# Patient Record
Sex: FEMALE | Race: BLACK OR AFRICAN AMERICAN | Employment: OTHER | URBAN - METROPOLITAN AREA
[De-identification: names, ages, dates, MRNs, and addresses within clinical notes are randomized per-mention and may not be internally consistent; named-entity substitution may affect disease eponyms.]

---

## 2018-04-23 RX ORDER — ACETAMINOPHEN 500 MG
1000 TABLET ORAL ONCE
Status: CANCELLED | OUTPATIENT
Start: 2018-04-23 | End: 2018-04-23

## 2018-04-23 RX ORDER — LANSOPRAZOLE 30 MG/1
30 CAPSULE, DELAYED RELEASE ORAL AS NEEDED
COMMUNITY

## 2018-04-23 RX ORDER — FAMOTIDINE 10 MG
10 TABLET ORAL 2 TIMES DAILY PRN
COMMUNITY

## 2018-04-23 RX ORDER — FEXOFENADINE HCL 180 MG/1
180 TABLET ORAL AS NEEDED
COMMUNITY

## 2018-04-24 ENCOUNTER — LAB ENCOUNTER (OUTPATIENT)
Dept: LAB | Facility: HOSPITAL | Age: 63
End: 2018-04-24
Attending: ORTHOPAEDIC SURGERY
Payer: MEDICARE

## 2018-04-24 DIAGNOSIS — Z01.818 PRE-OP TESTING: ICD-10-CM

## 2018-04-24 PROCEDURE — 36415 COLL VENOUS BLD VENIPUNCTURE: CPT

## 2018-04-24 PROCEDURE — 86901 BLOOD TYPING SEROLOGIC RH(D): CPT

## 2018-04-24 PROCEDURE — 86900 BLOOD TYPING SEROLOGIC ABO: CPT

## 2018-04-24 PROCEDURE — 86850 RBC ANTIBODY SCREEN: CPT

## 2018-04-30 NOTE — H&P
[facility]OSP - [facility]    History and Physical    Alamo Frazeejosé miguel Jeronimo Patient Status:  Surgery Admit    1955 MRN [id number]   [place] [address] Attending Murriel Bamberger, MD   Hosp Day # 0 PCP No primary care provider on to be done by Dr. Layne Chavarria. Risks, benefits, and alternative treatment discussed. Patient understands and elects to proceed with surgery. Consent signed.     Miguel De La Cruz PA-C  4/30/2018

## 2018-05-01 ENCOUNTER — HOSPITAL ENCOUNTER (INPATIENT)
Facility: HOSPITAL | Age: 63
LOS: 3 days | Discharge: HOME HEALTH | DRG: 470 | End: 2018-05-04
Attending: ORTHOPAEDIC SURGERY | Admitting: ORTHOPAEDIC SURGERY
Payer: MEDICARE

## 2018-05-01 ENCOUNTER — ANESTHESIA (OUTPATIENT)
Dept: SURGERY | Facility: HOSPITAL | Age: 63
DRG: 470 | End: 2018-05-01
Payer: MEDICARE

## 2018-05-01 ENCOUNTER — ANESTHESIA EVENT (OUTPATIENT)
Dept: SURGERY | Facility: HOSPITAL | Age: 63
DRG: 470 | End: 2018-05-01
Payer: MEDICARE

## 2018-05-01 ENCOUNTER — APPOINTMENT (OUTPATIENT)
Dept: GENERAL RADIOLOGY | Facility: HOSPITAL | Age: 63
DRG: 470 | End: 2018-05-01
Attending: ORTHOPAEDIC SURGERY
Payer: MEDICARE

## 2018-05-01 ENCOUNTER — SURGERY (OUTPATIENT)
Age: 63
End: 2018-05-01

## 2018-05-01 DIAGNOSIS — Z01.818 PRE-OP TESTING: Primary | ICD-10-CM

## 2018-05-01 DIAGNOSIS — M16.11 PRIMARY OSTEOARTHRITIS OF RIGHT HIP: ICD-10-CM

## 2018-05-01 PROBLEM — M17.11 PRIMARY OSTEOARTHRITIS OF RIGHT KNEE: Status: ACTIVE | Noted: 2018-05-01

## 2018-05-01 PROCEDURE — 99232 SBSQ HOSP IP/OBS MODERATE 35: CPT | Performed by: HOSPITALIST

## 2018-05-01 PROCEDURE — 73560 X-RAY EXAM OF KNEE 1 OR 2: CPT | Performed by: ORTHOPAEDIC SURGERY

## 2018-05-01 PROCEDURE — 0SRC0J9 REPLACEMENT OF RIGHT KNEE JOINT WITH SYNTHETIC SUBSTITUTE, CEMENTED, OPEN APPROACH: ICD-10-PCS | Performed by: ORTHOPAEDIC SURGERY

## 2018-05-01 DEVICE — PSN ASF PS 12MM PLY R 6-9CD: Type: IMPLANTABLE DEVICE | Site: KNEE | Status: FUNCTIONAL

## 2018-05-01 DEVICE — PSN TIB STM 5 DEG SZ D R: Type: IMPLANTABLE DEVICE | Site: KNEE | Status: FUNCTIONAL

## 2018-05-01 DEVICE — BONE CEMENT HI VISCOSITY R: Type: IMPLANTABLE DEVICE | Site: KNEE | Status: FUNCTIONAL

## 2018-05-01 DEVICE — PSN FEM PS CMT CCR NRW SZ7 R: Type: IMPLANTABLE DEVICE | Site: KNEE | Status: FUNCTIONAL

## 2018-05-01 DEVICE — PSN ALL POLY PAT PLY 38MM: Type: IMPLANTABLE DEVICE | Site: KNEE | Status: FUNCTIONAL

## 2018-05-01 RX ORDER — DOCUSATE SODIUM 100 MG/1
100 CAPSULE, LIQUID FILLED ORAL 2 TIMES DAILY
Status: DISCONTINUED | OUTPATIENT
Start: 2018-05-01 | End: 2018-05-04

## 2018-05-01 RX ORDER — SODIUM CHLORIDE, SODIUM LACTATE, POTASSIUM CHLORIDE, CALCIUM CHLORIDE 600; 310; 30; 20 MG/100ML; MG/100ML; MG/100ML; MG/100ML
INJECTION, SOLUTION INTRAVENOUS CONTINUOUS
Status: DISCONTINUED | OUTPATIENT
Start: 2018-05-01 | End: 2018-05-01 | Stop reason: HOSPADM

## 2018-05-01 RX ORDER — SODIUM CHLORIDE 0.9 % (FLUSH) 0.9 %
10 SYRINGE (ML) INJECTION AS NEEDED
Status: DISCONTINUED | OUTPATIENT
Start: 2018-05-01 | End: 2018-05-04

## 2018-05-01 RX ORDER — SENNOSIDES 8.6 MG
17.2 TABLET ORAL NIGHTLY
Status: DISCONTINUED | OUTPATIENT
Start: 2018-05-01 | End: 2018-05-04

## 2018-05-01 RX ORDER — POLYETHYLENE GLYCOL 3350 17 G/17G
17 POWDER, FOR SOLUTION ORAL DAILY
Status: DISCONTINUED | OUTPATIENT
Start: 2018-05-01 | End: 2018-05-04

## 2018-05-01 RX ORDER — FAMOTIDINE 20 MG/1
20 TABLET ORAL ONCE
Status: COMPLETED | OUTPATIENT
Start: 2018-05-01 | End: 2018-05-01

## 2018-05-01 RX ORDER — OXYCODONE HCL 10 MG/1
10 TABLET, FILM COATED, EXTENDED RELEASE ORAL ONCE
Status: COMPLETED | OUTPATIENT
Start: 2018-05-01 | End: 2018-05-01

## 2018-05-01 RX ORDER — DIPHENHYDRAMINE HYDROCHLORIDE 50 MG/ML
12.5 INJECTION INTRAMUSCULAR; INTRAVENOUS EVERY 4 HOURS PRN
Status: DISCONTINUED | OUTPATIENT
Start: 2018-05-01 | End: 2018-05-04

## 2018-05-01 RX ORDER — MORPHINE SULFATE 4 MG/ML
4 INJECTION, SOLUTION INTRAMUSCULAR; INTRAVENOUS EVERY 2 HOUR PRN
Status: DISCONTINUED | OUTPATIENT
Start: 2018-05-01 | End: 2018-05-04

## 2018-05-01 RX ORDER — DIPHENHYDRAMINE HCL 25 MG
25 CAPSULE ORAL EVERY 4 HOURS PRN
Status: DISCONTINUED | OUTPATIENT
Start: 2018-05-01 | End: 2018-05-04

## 2018-05-01 RX ORDER — MORPHINE SULFATE 2 MG/ML
2 INJECTION, SOLUTION INTRAMUSCULAR; INTRAVENOUS EVERY 2 HOUR PRN
Status: DISCONTINUED | OUTPATIENT
Start: 2018-05-01 | End: 2018-05-04

## 2018-05-01 RX ORDER — NAPROXEN 500 MG/1
500 TABLET ORAL 2 TIMES DAILY WITH MEALS
Qty: 60 TABLET | Refills: 0 | Status: SHIPPED | OUTPATIENT
Start: 2018-05-01 | End: 2019-11-05

## 2018-05-01 RX ORDER — POLYETHYLENE GLYCOL 3350 17 G/17G
17 POWDER, FOR SOLUTION ORAL DAILY PRN
Status: DISCONTINUED | OUTPATIENT
Start: 2018-05-01 | End: 2018-05-04

## 2018-05-01 RX ORDER — SODIUM CHLORIDE 9 MG/ML
INJECTION, SOLUTION INTRAVENOUS CONTINUOUS
Status: DISCONTINUED | OUTPATIENT
Start: 2018-05-01 | End: 2018-05-04

## 2018-05-01 RX ORDER — HYDROCODONE BITARTRATE AND ACETAMINOPHEN 10; 325 MG/1; MG/1
1 TABLET ORAL EVERY 4 HOURS PRN
Status: DISCONTINUED | OUTPATIENT
Start: 2018-05-01 | End: 2018-05-04

## 2018-05-01 RX ORDER — HYDROCODONE BITARTRATE AND ACETAMINOPHEN 5; 325 MG/1; MG/1
2 TABLET ORAL AS NEEDED
Status: DISCONTINUED | OUTPATIENT
Start: 2018-05-01 | End: 2018-05-01 | Stop reason: HOSPADM

## 2018-05-01 RX ORDER — FAMOTIDINE 20 MG/1
10 TABLET ORAL 2 TIMES DAILY
Status: DISCONTINUED | OUTPATIENT
Start: 2018-05-01 | End: 2018-05-04

## 2018-05-01 RX ORDER — LIDOCAINE HYDROCHLORIDE 10 MG/ML
INJECTION, SOLUTION EPIDURAL; INFILTRATION; INTRACAUDAL; PERINEURAL AS NEEDED
Status: DISCONTINUED | OUTPATIENT
Start: 2018-05-01 | End: 2018-05-01 | Stop reason: SURG

## 2018-05-01 RX ORDER — DIPHENHYDRAMINE HCL 25 MG
25 CAPSULE ORAL EVERY 4 HOURS PRN
Status: DISPENSED | OUTPATIENT
Start: 2018-05-01 | End: 2018-05-02

## 2018-05-01 RX ORDER — PYRIDOXINE HCL (VITAMIN B6) 100 MG
500 TABLET ORAL
Status: ON HOLD | COMMUNITY
End: 2018-05-01

## 2018-05-01 RX ORDER — HYDROMORPHONE HYDROCHLORIDE 1 MG/ML
0.6 INJECTION, SOLUTION INTRAMUSCULAR; INTRAVENOUS; SUBCUTANEOUS
Status: ACTIVE | OUTPATIENT
Start: 2018-05-01 | End: 2018-05-02

## 2018-05-01 RX ORDER — HYDROCODONE BITARTRATE AND ACETAMINOPHEN 10; 325 MG/1; MG/1
1 TABLET ORAL EVERY 4 HOURS PRN
Qty: 40 TABLET | Refills: 0 | Status: SHIPPED | OUTPATIENT
Start: 2018-05-01 | End: 2019-01-22 | Stop reason: ALTCHOICE

## 2018-05-01 RX ORDER — METOCLOPRAMIDE HYDROCHLORIDE 5 MG/ML
10 INJECTION INTRAMUSCULAR; INTRAVENOUS EVERY 6 HOURS PRN
Status: DISPENSED | OUTPATIENT
Start: 2018-05-01 | End: 2018-05-03

## 2018-05-01 RX ORDER — HYDROCODONE BITARTRATE AND ACETAMINOPHEN 7.5; 325 MG/1; MG/1
2 TABLET ORAL EVERY 6 HOURS PRN
Status: ACTIVE | OUTPATIENT
Start: 2018-05-01 | End: 2018-05-02

## 2018-05-01 RX ORDER — DIPHENHYDRAMINE HYDROCHLORIDE 50 MG/ML
25 INJECTION INTRAMUSCULAR; INTRAVENOUS ONCE AS NEEDED
Status: ACTIVE | OUTPATIENT
Start: 2018-05-01 | End: 2018-05-01

## 2018-05-01 RX ORDER — DIPHENHYDRAMINE HYDROCHLORIDE 50 MG/ML
12.5 INJECTION INTRAMUSCULAR; INTRAVENOUS EVERY 4 HOURS PRN
Status: ACTIVE | OUTPATIENT
Start: 2018-05-01 | End: 2018-05-02

## 2018-05-01 RX ORDER — HYDROMORPHONE HYDROCHLORIDE 1 MG/ML
0.4 INJECTION, SOLUTION INTRAMUSCULAR; INTRAVENOUS; SUBCUTANEOUS EVERY 5 MIN PRN
Status: DISCONTINUED | OUTPATIENT
Start: 2018-05-01 | End: 2018-05-01 | Stop reason: HOSPADM

## 2018-05-01 RX ORDER — CELECOXIB 200 MG/1
200 CAPSULE ORAL ONCE
Status: COMPLETED | OUTPATIENT
Start: 2018-05-01 | End: 2018-05-01

## 2018-05-01 RX ORDER — ONDANSETRON 2 MG/ML
INJECTION INTRAMUSCULAR; INTRAVENOUS AS NEEDED
Status: DISCONTINUED | OUTPATIENT
Start: 2018-05-01 | End: 2018-05-01 | Stop reason: SURG

## 2018-05-01 RX ORDER — NALOXONE HYDROCHLORIDE 0.4 MG/ML
0.08 INJECTION, SOLUTION INTRAMUSCULAR; INTRAVENOUS; SUBCUTANEOUS
Status: ACTIVE | OUTPATIENT
Start: 2018-05-01 | End: 2018-05-02

## 2018-05-01 RX ORDER — ACETAMINOPHEN 325 MG/1
650 TABLET ORAL EVERY 6 HOURS PRN
Status: ACTIVE | OUTPATIENT
Start: 2018-05-01 | End: 2018-05-02

## 2018-05-01 RX ORDER — CEFAZOLIN SODIUM/WATER 2 G/20 ML
2 SYRINGE (ML) INTRAVENOUS EVERY 8 HOURS
Status: COMPLETED | OUTPATIENT
Start: 2018-05-01 | End: 2018-05-01

## 2018-05-01 RX ORDER — ENOXAPARIN SODIUM 100 MG/ML
30 INJECTION SUBCUTANEOUS ONCE
Status: COMPLETED | OUTPATIENT
Start: 2018-05-01 | End: 2018-05-01

## 2018-05-01 RX ORDER — POLYETHYLENE GLYCOL 3350 17 G/17G
17 POWDER, FOR SOLUTION ORAL DAILY
COMMUNITY

## 2018-05-01 RX ORDER — NALBUPHINE HCL 10 MG/ML
2.5 AMPUL (ML) INJECTION EVERY 4 HOURS PRN
Status: ACTIVE | OUTPATIENT
Start: 2018-05-01 | End: 2018-05-02

## 2018-05-01 RX ORDER — CYANOCOBALAMIN (VITAMIN B-12) 1000 MCG
1 TABLET, EXTENDED RELEASE ORAL 2 TIMES DAILY WITH MEALS
Status: DISCONTINUED | OUTPATIENT
Start: 2018-05-01 | End: 2018-05-04

## 2018-05-01 RX ORDER — SODIUM PHOSPHATE, DIBASIC AND SODIUM PHOSPHATE, MONOBASIC 7; 19 G/133ML; G/133ML
1 ENEMA RECTAL ONCE AS NEEDED
Status: DISCONTINUED | OUTPATIENT
Start: 2018-05-01 | End: 2018-05-04

## 2018-05-01 RX ORDER — NAPROXEN 500 MG/1
500 TABLET ORAL 2 TIMES DAILY WITH MEALS
Status: DISCONTINUED | OUTPATIENT
Start: 2018-05-01 | End: 2018-05-04

## 2018-05-01 RX ORDER — HYDROMORPHONE HYDROCHLORIDE 1 MG/ML
0.6 INJECTION, SOLUTION INTRAMUSCULAR; INTRAVENOUS; SUBCUTANEOUS EVERY 5 MIN PRN
Status: DISCONTINUED | OUTPATIENT
Start: 2018-05-01 | End: 2018-05-01 | Stop reason: HOSPADM

## 2018-05-01 RX ORDER — ACETAMINOPHEN 500 MG
500 TABLET ORAL EVERY 6 HOURS PRN
Status: DISCONTINUED | OUTPATIENT
Start: 2018-05-01 | End: 2018-05-04

## 2018-05-01 RX ORDER — CEFAZOLIN SODIUM/WATER 2 G/20 ML
2 SYRINGE (ML) INTRAVENOUS ONCE
Status: COMPLETED | OUTPATIENT
Start: 2018-05-01 | End: 2018-05-01

## 2018-05-01 RX ORDER — DEXAMETHASONE SODIUM PHOSPHATE 4 MG/ML
VIAL (ML) INJECTION AS NEEDED
Status: DISCONTINUED | OUTPATIENT
Start: 2018-05-01 | End: 2018-05-01 | Stop reason: SURG

## 2018-05-01 RX ORDER — ACETAMINOPHEN 500 MG
500 TABLET ORAL EVERY 6 HOURS PRN
COMMUNITY

## 2018-05-01 RX ORDER — PANTOPRAZOLE SODIUM 20 MG/1
20 TABLET, DELAYED RELEASE ORAL
Status: DISCONTINUED | OUTPATIENT
Start: 2018-05-01 | End: 2018-05-04

## 2018-05-01 RX ORDER — BIOTIN 1 MG
1 TABLET ORAL DAILY
COMMUNITY
End: 2019-03-06

## 2018-05-01 RX ORDER — METOCLOPRAMIDE 10 MG/1
10 TABLET ORAL ONCE
Status: COMPLETED | OUTPATIENT
Start: 2018-05-01 | End: 2018-05-01

## 2018-05-01 RX ORDER — PYRIDOXINE HCL (VITAMIN B6) 100 MG
500 TABLET ORAL 2 TIMES DAILY
Qty: 60 TABLET | Refills: 0 | Status: SHIPPED | OUTPATIENT
Start: 2018-05-01 | End: 2019-03-06

## 2018-05-01 RX ORDER — ACETAMINOPHEN 500 MG
1000 TABLET ORAL ONCE
Status: COMPLETED | OUTPATIENT
Start: 2018-05-01 | End: 2018-05-01

## 2018-05-01 RX ORDER — HALOPERIDOL 5 MG/ML
0.5 INJECTION INTRAMUSCULAR ONCE AS NEEDED
Status: ACTIVE | OUTPATIENT
Start: 2018-05-01 | End: 2018-05-01

## 2018-05-01 RX ORDER — MULTIPLE VITAMINS W/ MINERALS TAB 9MG-400MCG
1 TAB ORAL DAILY
Status: DISCONTINUED | OUTPATIENT
Start: 2018-05-01 | End: 2018-05-04

## 2018-05-01 RX ORDER — HYDROMORPHONE HYDROCHLORIDE 1 MG/ML
0.4 INJECTION, SOLUTION INTRAMUSCULAR; INTRAVENOUS; SUBCUTANEOUS
Status: ACTIVE | OUTPATIENT
Start: 2018-05-01 | End: 2018-05-02

## 2018-05-01 RX ORDER — BISACODYL 10 MG
10 SUPPOSITORY, RECTAL RECTAL
Status: DISCONTINUED | OUTPATIENT
Start: 2018-05-01 | End: 2018-05-04

## 2018-05-01 RX ORDER — HYDROMORPHONE HYDROCHLORIDE 1 MG/ML
0.2 INJECTION, SOLUTION INTRAMUSCULAR; INTRAVENOUS; SUBCUTANEOUS EVERY 5 MIN PRN
Status: DISCONTINUED | OUTPATIENT
Start: 2018-05-01 | End: 2018-05-01 | Stop reason: HOSPADM

## 2018-05-01 RX ORDER — MORPHINE SULFATE 1 MG/ML
INJECTION, SOLUTION EPIDURAL; INTRATHECAL; INTRAVENOUS AS NEEDED
Status: DISCONTINUED | OUTPATIENT
Start: 2018-05-01 | End: 2018-05-01 | Stop reason: SURG

## 2018-05-01 RX ORDER — CETIRIZINE HYDROCHLORIDE 10 MG/1
10 TABLET ORAL DAILY
Status: DISCONTINUED | OUTPATIENT
Start: 2018-05-01 | End: 2018-05-04

## 2018-05-01 RX ORDER — HALOPERIDOL 5 MG/ML
0.25 INJECTION INTRAMUSCULAR ONCE AS NEEDED
Status: DISCONTINUED | OUTPATIENT
Start: 2018-05-01 | End: 2018-05-01 | Stop reason: HOSPADM

## 2018-05-01 RX ORDER — GABAPENTIN 300 MG/1
600 CAPSULE ORAL ONCE
Status: COMPLETED | OUTPATIENT
Start: 2018-05-01 | End: 2018-05-01

## 2018-05-01 RX ORDER — HYDROCODONE BITARTRATE AND ACETAMINOPHEN 5; 325 MG/1; MG/1
1 TABLET ORAL AS NEEDED
Status: DISCONTINUED | OUTPATIENT
Start: 2018-05-01 | End: 2018-05-01 | Stop reason: HOSPADM

## 2018-05-01 RX ORDER — BUPIVACAINE HYDROCHLORIDE 7.5 MG/ML
INJECTION, SOLUTION EPIDURAL; RETROBULBAR AS NEEDED
Status: DISCONTINUED | OUTPATIENT
Start: 2018-05-01 | End: 2018-05-01 | Stop reason: SURG

## 2018-05-01 RX ORDER — HYDROCODONE BITARTRATE AND ACETAMINOPHEN 7.5; 325 MG/1; MG/1
1 TABLET ORAL EVERY 6 HOURS PRN
Status: ACTIVE | OUTPATIENT
Start: 2018-05-01 | End: 2018-05-02

## 2018-05-01 RX ORDER — NALOXONE HYDROCHLORIDE 0.4 MG/ML
80 INJECTION, SOLUTION INTRAMUSCULAR; INTRAVENOUS; SUBCUTANEOUS AS NEEDED
Status: DISCONTINUED | OUTPATIENT
Start: 2018-05-01 | End: 2018-05-01 | Stop reason: HOSPADM

## 2018-05-01 RX ORDER — MORPHINE SULFATE 10 MG/ML
INJECTION, SOLUTION INTRAMUSCULAR; INTRAVENOUS AS NEEDED
Status: DISCONTINUED | OUTPATIENT
Start: 2018-05-01 | End: 2018-05-01 | Stop reason: SURG

## 2018-05-01 RX ORDER — MIDAZOLAM HYDROCHLORIDE 1 MG/ML
INJECTION INTRAMUSCULAR; INTRAVENOUS AS NEEDED
Status: DISCONTINUED | OUTPATIENT
Start: 2018-05-01 | End: 2018-05-01 | Stop reason: SURG

## 2018-05-01 RX ORDER — MORPHINE SULFATE 2 MG/ML
1 INJECTION, SOLUTION INTRAMUSCULAR; INTRAVENOUS EVERY 2 HOUR PRN
Status: DISCONTINUED | OUTPATIENT
Start: 2018-05-01 | End: 2018-05-04

## 2018-05-01 RX ORDER — ONDANSETRON 2 MG/ML
4 INJECTION INTRAMUSCULAR; INTRAVENOUS EVERY 4 HOURS PRN
Status: DISCONTINUED | OUTPATIENT
Start: 2018-05-01 | End: 2018-05-04

## 2018-05-01 RX ORDER — ONDANSETRON 2 MG/ML
4 INJECTION INTRAMUSCULAR; INTRAVENOUS ONCE AS NEEDED
Status: COMPLETED | OUTPATIENT
Start: 2018-05-01 | End: 2018-05-01

## 2018-05-01 RX ADMIN — MIDAZOLAM HYDROCHLORIDE 1.5 MG: 1 INJECTION INTRAMUSCULAR; INTRAVENOUS at 07:34:00

## 2018-05-01 RX ADMIN — SODIUM CHLORIDE, SODIUM LACTATE, POTASSIUM CHLORIDE, CALCIUM CHLORIDE: 600; 310; 30; 20 INJECTION, SOLUTION INTRAVENOUS at 09:27:00

## 2018-05-01 RX ADMIN — BUPIVACAINE HYDROCHLORIDE 1.2 ML: 7.5 INJECTION, SOLUTION EPIDURAL; RETROBULBAR at 07:41:00

## 2018-05-01 RX ADMIN — DEXAMETHASONE SODIUM PHOSPHATE 4 MG: 4 MG/ML VIAL (ML) INJECTION at 08:06:00

## 2018-05-01 RX ADMIN — SODIUM CHLORIDE, SODIUM LACTATE, POTASSIUM CHLORIDE, CALCIUM CHLORIDE: 600; 310; 30; 20 INJECTION, SOLUTION INTRAVENOUS at 10:09:00

## 2018-05-01 RX ADMIN — LIDOCAINE HYDROCHLORIDE 25 MG: 10 INJECTION, SOLUTION EPIDURAL; INFILTRATION; INTRACAUDAL; PERINEURAL at 07:43:00

## 2018-05-01 RX ADMIN — SODIUM CHLORIDE, SODIUM LACTATE, POTASSIUM CHLORIDE, CALCIUM CHLORIDE: 600; 310; 30; 20 INJECTION, SOLUTION INTRAVENOUS at 07:34:00

## 2018-05-01 RX ADMIN — ONDANSETRON 4 MG: 2 INJECTION INTRAMUSCULAR; INTRAVENOUS at 09:45:00

## 2018-05-01 RX ADMIN — LIDOCAINE HYDROCHLORIDE 15 MG: 10 INJECTION, SOLUTION EPIDURAL; INFILTRATION; INTRACAUDAL; PERINEURAL at 07:38:00

## 2018-05-01 RX ADMIN — CEFAZOLIN SODIUM/WATER 2 G: 2 G/20 ML SYRINGE (ML) INTRAVENOUS at 07:48:00

## 2018-05-01 RX ADMIN — MORPHINE SULFATE 0.3 MG: 1 INJECTION, SOLUTION EPIDURAL; INTRATHECAL; INTRAVENOUS at 07:41:00

## 2018-05-01 RX ADMIN — MORPHINE SULFATE 0.5 MG: 10 INJECTION, SOLUTION INTRAMUSCULAR; INTRAVENOUS at 10:03:00

## 2018-05-01 NOTE — CM/SW NOTE
SW received order for s/p total joint and fresh post op. SW met w/ pt to discuss eventual discharge plans. Pt lives at home w/ her supportive  and son. Pt states that her  works part time and her son works full time.  Pt lives in a 1 level [facility]

## 2018-05-01 NOTE — PHYSICAL THERAPY NOTE
Chart reviewed, discussed case with RN. Patient has been feeling dizzy with mild nausea since admitted to the floor.  Greeted patient, patient reporting persistent dizziness, mild nausea, and lethargy- falling asleep during questioning; not appropriate for

## 2018-05-01 NOTE — INTERVAL H&P NOTE
Pre-op Diagnosis: right knee osteoarthritis    The above referenced H&P was reviewed by Nely Guillory MD on 5/1/2018, the patient was examined and no significant changes have occurred in the patient's condition since the H&P was performed.   I discussed

## 2018-05-01 NOTE — PROGRESS NOTES
[facility]    Progress Note    Andrea Jeronimo Patient Status:  Inpatient    1955 MRN [id number]   Location [facility] 4W/SW/SE Attending Bonita Chery MD   Hosp Day # 0 PCP No primary care provider on file.      Lisa Ambrosio CREATSERUM, BUN, NA, K, CL, CO2, GLU, CA, ALB, ALKPHO, BILT, TP, AST, ALT, PTT, INR, PT, T4F, TSH, TSHREFLEX, MARIANO, LIP, GGT, PSA, DDIMER, ESRML, ESRPF, CRP, BNP, MG, PHOS, TROP, CK, CKMB, SENG, RPR, B12, ETOH, POCGLU                    Mat Roche MD  5

## 2018-05-01 NOTE — ANESTHESIA PROCEDURE NOTES
Spinal Block  Performed by: Donelle Scheuermann  Authorized by: Nancy Fulton     Patient Location:  OR  Start Time:  5/1/2018 7:38 AM  End Time:  5/1/2018 7:41 AM  Site identification: surface landmarks    Reason for Block: at surgeon's request,

## 2018-05-01 NOTE — ANESTHESIA POSTPROCEDURE EVALUATION
Patient: Elio Jeronimo    Procedure Summary     Date:  05/01/18 Room / Location:  [address] [place] [address] MAIN OR 05 / [address] [facility] MAIN OR    Anesthesia Start:  5753 Anesthesia Stop:  9824    Procedure:  KNEE TOTAL REPLACEMENT (Right ) Diagnosis:  (right knee osteoarthritis, obe

## 2018-05-01 NOTE — ANESTHESIA POSTPROCEDURE EVALUATION
Patient: Odalis Jeronimo    Procedure Summary     Date:  05/01/18 Room / Location:  [address] [place] [address] MAIN OR 05 / [address] [place] [address] MAIN OR    Anesthesia Start:  8663 Anesthesia Stop:      Procedure:  KNEE TOTAL REPLACEMENT (Right ) Diagnosis:  (right knee osteoarthritis, obesity

## 2018-05-01 NOTE — ANESTHESIA PREPROCEDURE EVALUATION
Anesthesia PreOp Note    HPI:     Vidya Jimenez is a 70year old female who presents for preoperative consultation requested by: Ozzie Oconnor MD    Date of Surgery: 5/1/2018    Procedure(s):  KNEE TOTAL REPLACEMENT  Indication: right knee osteoar mg Intravenous Once Jelly Gayle MD   Tranexamic Acid (CYKLOKAPRON) 1,000 mg in sodium chloride 0.9 % 60 mL IVPB 1,000 mg Intravenous Once Jelly Gayle MD   Povidone-Iodine 10 % 17.5 mL in sodium chloride 0.9 % 500 mL irrigation  Irrigation Once normal exam             Anesthesia Plan:   ASA:  1  Plan:   MAC and spinal  Post-op Pain Management: IV analgesics, Oral pain medication and Intrathecal narcotics  Informed Consent Plan and Risks Discussed With:  Patient and spouse  Discussed plan with:  ALMA DELIA

## 2018-05-02 PROCEDURE — 99233 SBSQ HOSP IP/OBS HIGH 50: CPT | Performed by: HOSPITALIST

## 2018-05-02 RX ORDER — OXYCODONE HCL 10 MG/1
10 TABLET, FILM COATED, EXTENDED RELEASE ORAL EVERY 12 HOURS
Status: DISCONTINUED | OUTPATIENT
Start: 2018-05-02 | End: 2018-05-04

## 2018-05-02 RX ORDER — HYDROCODONE BITARTRATE AND ACETAMINOPHEN 10; 325 MG/1; MG/1
1 TABLET ORAL EVERY 4 HOURS PRN
Status: DISCONTINUED | OUTPATIENT
Start: 2018-05-02 | End: 2018-05-04

## 2018-05-02 NOTE — OCCUPATIONAL THERAPY NOTE
OCCUPATIONAL THERAPY EVALUATION - INPATIENT     Room Number: 428/428-A  Evaluation Date: 5/2/2018  Type of Evaluation: Initial  Presenting Problem: R TKR    Physician Order: IP Consult to Occupational Therapy  Reason for Therapy: ADL/IADL Dysfunction and Comment: DR. Debora Cuenca    HOME SITUATION  Type of Home: House  Home Layout: Multi-level  Lives With: Spouse                      Drives: Yes       Stairs in Home: multilevel  Use of Assistive Device(s): none    Prior Level of [place]: Pt was independ Sit : Not tested  Sit to Stand: Modified independent  Toilet Transfer: mod I  Shower Transfer: NT  Chair Transfer: mod I    Bedroom Mobility: mod I w/ RW    BALANCE ASSESSMENT  Static Sitting: mod I  Dynamic Sitting: NT  Static Standing: mod I  Dynamic Sta

## 2018-05-02 NOTE — CM/SW NOTE
MD order received regarding discharge planning for [facility]. Referral has been made to [facility] and [facility] orders have been entered. SW will notify [facility] when the pt. Is discharging.       Vicente Villa [facility] ext [phone]

## 2018-05-02 NOTE — HOME CARE LIAISON
Received referral from [address] [place] [address], [address]. Met with patient at the bedside. Patient is agreeable to [facility], pending orders. Brochure and liaison's business card provided with contact information. All questions addressed and answered.

## 2018-05-02 NOTE — OPERATIVE REPORT
[facility]    PATIENT'S NAME: Eric Brandt   ATTENDING PHYSICIAN: Ata Jimenez MD   OPERATING PHYSICIAN: Nicohlas Alamo MD   PATIENT ACCOUNT#:   [id number]    LOCATION:  [facility] PACU 6 [facility] 10  MEDICAL RECORD #:   [id number]       DA tourniquet placed on the upper right thigh. An SCD boot was already on the left leg. The right leg and knee were then prepped and draped in sterile fashion with Betadine scrub and paint, followed by ChloraPrep.   The leg was exsanguinated and tourniquet i either. She had true full extension, good flexion without liftoff, and excellent patellar tracking without need for lateral release. Size 12 mm spacer fit optimally.     The tibia was now nailed into proper rotation and the stem prepared, and the pegs for

## 2018-05-02 NOTE — PROGRESS NOTES
[place] [facility] - [facility]    Progress Note    Elio Tete Cortezs Patient Status:  Inpatient    1955 MRN [id number]   Location [facility] 4W/SW/SE Attending Nilson Bustillo Day # 1 PCP No primary care provider on file. 5/1/2018  CONCLUSION:  Status post right knee arthroplasty with intact components in anatomic alignment. No radiographic evidence of immediate postoperative application.      Dictated by (CST): Jesús Ovalle MD on 5/01/2018 at 17:03     Approved by (CST):

## 2018-05-02 NOTE — ANESTHESIA POST-OP FOLLOW-UP NOTE
Ms. Garcia  is POD#1 after her right knee replacement performed under spinal anesthesia.  Denies headache, back pain, or residual LE weakness/numbness from her spinal.   Did have some dizziness and nausea with standing yesterday, feels it has imp

## 2018-05-02 NOTE — PHYSICAL THERAPY NOTE
PHYSICAL THERAPY KNEE EVALUATION - INPATIENT       Room Number: 428/428-A  Evaluation Date: 5/2/2018  Type of Evaluation: Initial  Physician Order: PT Eval and Treat    Presenting Problem: TKR RLE  Reason for Therapy: Mobility Dysfunction and Discharge [place] Level of [place]: Indep with all ADL's and driving    SUBJECTIVE  I feel ok just a bit of dizziness and I feel the RLE feels heavy when I try to walk.      PHYSICAL THERAPY EXAMINATION     OBJECTIVE  Precautions:  (TKR RLE)  Fall Risk: Standard fall ri 100  Assistive Device: Rolling walker  Pattern: R Decreased stance time (decrrease pino and step length)  Stoop/Curb Assistance: Not tested  Comment : intermittent dizziness    Bed Mobility: min a with RLE    Transfers: Min A with sit to stand with RW

## 2018-05-02 NOTE — PROGRESS NOTES
[place] [facility] - [facility]    Progress Note    Randiarielle Jeronimo Patient Status:  Inpatient    1955 MRN [id number]   Location [facility] 4W/SW/SE Attending Romulo Fernandez MD   Hosp Day # 1 PCP No primary care provider on file.      Licha Lucero

## 2018-05-02 NOTE — PHYSICAL THERAPY NOTE
PT PM session: Pt education for gait and transfer training with a RW for sequencing and a step to gait pattern 150'. Pt reported no dizziness or shortness of breath. Pt is on track for a home D/C with family assist as medical progress allows.  Live Saunders with PT i

## 2018-05-02 NOTE — CM/SW NOTE
CTL progression of care note: Message left with office of Dr. Alina Fonseca ([phone]) r/t discharge plan of care/therapy. Received call from [facility] with Dr. Alina Fonseca. Pt to have [facility] upon discharge.

## 2018-05-03 PROCEDURE — 99233 SBSQ HOSP IP/OBS HIGH 50: CPT | Performed by: HOSPITALIST

## 2018-05-03 RX ORDER — NALOXONE HYDROCHLORIDE 4 MG/.1ML
4 SPRAY, METERED NASAL AS NEEDED
Qty: 1 EACH | Refills: 0 | Status: ON HOLD | OUTPATIENT
Start: 2018-05-03 | End: 2020-07-10

## 2018-05-03 RX ORDER — POTASSIUM CHLORIDE 20 MEQ/1
40 TABLET, EXTENDED RELEASE ORAL ONCE
Status: DISCONTINUED | OUTPATIENT
Start: 2018-05-03 | End: 2018-05-04

## 2018-05-03 RX ORDER — OXYCODONE HCL 10 MG/1
10 TABLET, FILM COATED, EXTENDED RELEASE ORAL EVERY 12 HOURS
Qty: 28 TABLET | Refills: 0 | Status: SHIPPED | OUTPATIENT
Start: 2018-05-03 | End: 2019-01-22 | Stop reason: ALTCHOICE

## 2018-05-03 NOTE — CM/SW NOTE
CTL note: met with pt and  at bedside. Pt is BPCI eligible under . [employer] program including 90 day post discharge phone call follow up reviewed with Ethan Roldan and her  who verbalize understanding.  [employer] Brochure and Medicare le

## 2018-05-03 NOTE — PROGRESS NOTES
[place] [facility] - [facility]    Progress Note    Efrain Hsulisette Patient Status:  Inpatient    1955 MRN [id number]   Location [facility] 4W/SW/SE Attending Nilson Bustillo Day # 2 PCP No primary care provider on file. 05/03/2018   K 3.8 05/03/2018    05/03/2018   CO2 29 05/03/2018    (H) 05/03/2018   CA 8.4 (L) 05/03/2018   MG 2.0 05/03/2018                         Leatha Martinez MD  5/3/2018

## 2018-05-03 NOTE — PROGRESS NOTES
[place] [facility] - [facility]    Progress Note    Vanesa Jeronimo Patient Status:  Inpatient    1955 MRN [id number]   Location [facility] 4W/SW/SE Attending Nilson Bustillo Day # 2 PCP No primary care provider on file.

## 2018-05-03 NOTE — PHYSICAL THERAPY NOTE
PHYSICAL THERAPY KNEE TREATMENT NOTE - INPATIENT     Room Number: 428/428-A             Presenting Problem: TKR RLE    Problem List  Principal Problem:    Primary osteoarthritis of right knee      ASSESSMENT    AM SESSION: Pt received in the chair.  Pt with Standing: CONNIE Hankins 119  Room air    AM-PAC '6-Clicks' INPATIENT SHORT FORM - BASIC MOBILITY  How much difficulty does the patient currently have. ..  -   Turning over in bed (including adjusting bedclothes, sheets and blankets)?: None   -   Si Stand at assistance level: modified independent     Goal #2  Current Status SBA with the RW am session am/pm sessions   Goal #3 Patient is able to ambulate 300 feet with assistive device at assistance level: modified independent    Goal #3   Current Status

## 2018-05-04 VITALS
HEART RATE: 103 BPM | SYSTOLIC BLOOD PRESSURE: 154 MMHG | BODY MASS INDEX: 35.44 KG/M2 | HEIGHT: 63 IN | TEMPERATURE: 99 F | DIASTOLIC BLOOD PRESSURE: 49 MMHG | WEIGHT: 200 LBS | RESPIRATION RATE: 16 BRPM | OXYGEN SATURATION: 97 %

## 2018-05-04 PROCEDURE — 99239 HOSP IP/OBS DSCHRG MGMT >30: CPT | Performed by: HOSPITALIST

## 2018-05-04 RX ORDER — POTASSIUM CHLORIDE 20 MEQ/1
40 TABLET, EXTENDED RELEASE ORAL ONCE
Status: COMPLETED | OUTPATIENT
Start: 2018-05-04 | End: 2018-05-04

## 2018-05-04 NOTE — PHYSICAL THERAPY NOTE
PHYSICAL THERAPY KNEE TREATMENT NOTE - INPATIENT     Room Number: 428/428-A             Presenting Problem: TKR RLE    Problem List  Principal Problem:    Primary osteoarthritis of right knee      ASSESSMENT   Received pt in the chair.  Pt performed exercis much help from another person does the patient currently need. ..   -   Moving to and from a bed to a chair (including a wheelchair)?: A Little   -   Need to walk in hospital room?: A Little   -   Climbing 3-5 steps with a railing?: A Little     AM-PAC Scor Goal #5   Current Status IN PROGRESS   Goal #6 Patient independently performs home exercise program for ROM/strengthening per the instructions provided in preparation for discharge.    Goal #6  Current Status IN PROGRESS

## 2018-05-04 NOTE — PLAN OF CARE
DISCHARGE PLANNING    • Discharge to home or other facility with appropriate resources Adequate for Discharge        Plan at this time is for Jeremy Kaufman to return home today with her  and begin home health care PT.     Impaired Activities of Daily [place] Discharge    • Incision(s), wounds(s) or drain site(s) healing without S/S of infection Adequate for Discharge        Dressing remains intact to rt knee. Patient sent home with aquacell foam dressing for home health rn to change in 2 days.     Prescriptions

## 2018-05-04 NOTE — PROGRESS NOTES
[place] [facility] - [facility]    Progress Note    Lisa Jeronimo Patient Status:  Inpatient    1955 MRN [id number]   Location [facility] 4W/SW/SE Attending Nilson Bustillo Day # 3 PCP No primary care provider on file.

## 2018-05-04 NOTE — CM/SW NOTE
The pt. Is scheduled to discharge home today 5/4 with [facility]. [facility] is aware of discharge.       Jessica Zambrano [place] ext [phone]

## 2018-05-04 NOTE — DISCHARGE SUMMARY
More than 30 min spent on dc  Dc summary # [id number]  Hospital Discharge Diagnoses: tka    Lace+ Score: 9  59-90 High Risk  29-58 Medium Risk  0-28   Low Risk.     TCM Follow-Up Recommendation:  LACE < 29: Low Risk of readmission after discharge from the hos

## 2018-05-07 NOTE — DISCHARGE SUMMARY
[facility]    PATIENT'S NAME: Radhaangela Javier ROMERO   ATTENDING PHYSICIAN: Brooklyn Bustillo MD   PATIENT ACCOUNT#:   [id number]    LOCATION:  [address] [place] [address] RECORD #:   [id number]       YOB: 1955  ADMISSION DATE:       0 Hopefully, will replete with iron or diet. 4.   Neutrophilia. Patient told to take incentive spirometer home. Probably from atelectasis. Dictated By Rashad Tadeo.  MD Iwona  d: 05/06/2018 17:11:13  t: 05/06/2018 20:15:26  [facility] [id number]  LAS/C03

## 2018-05-11 ENCOUNTER — HOSPITAL ENCOUNTER (OUTPATIENT)
Dept: ULTRASOUND IMAGING | Facility: HOSPITAL | Age: 63
Discharge: HOME | End: 2018-05-11
Attending: ORTHOPAEDIC SURGERY
Payer: MEDICARE

## 2018-05-11 DIAGNOSIS — M79.89 LEG SWELLING: ICD-10-CM

## 2018-05-11 DIAGNOSIS — Z96.659 STATUS POST TOTAL KNEE REPLACEMENT: ICD-10-CM

## 2018-05-11 DIAGNOSIS — M79.606 LEG PAIN: ICD-10-CM

## 2018-05-11 PROCEDURE — 93971 EXTREMITY STUDY: CPT | Performed by: ORTHOPAEDIC SURGERY

## 2019-01-22 ENCOUNTER — TELEPHONE (OUTPATIENT)
Dept: NEUROLOGY | Facility: CLINIC | Age: 64
End: 2019-01-22

## 2019-01-22 ENCOUNTER — OFFICE VISIT (OUTPATIENT)
Dept: NEUROLOGY | Facility: CLINIC | Age: 64
End: 2019-01-22
Payer: MEDICARE

## 2019-01-22 VITALS
SYSTOLIC BLOOD PRESSURE: 144 MMHG | BODY MASS INDEX: 34.37 KG/M2 | DIASTOLIC BLOOD PRESSURE: 80 MMHG | HEART RATE: 64 BPM | HEIGHT: 63 IN | WEIGHT: 194 LBS

## 2019-01-22 DIAGNOSIS — M70.61 GREATER TROCHANTERIC BURSITIS OF BOTH HIPS: ICD-10-CM

## 2019-01-22 DIAGNOSIS — E66.09 CLASS 1 OBESITY DUE TO EXCESS CALORIES WITHOUT SERIOUS COMORBIDITY WITH BODY MASS INDEX (BMI) OF 34.0 TO 34.9 IN ADULT: Primary | ICD-10-CM

## 2019-01-22 DIAGNOSIS — M70.62 GREATER TROCHANTERIC BURSITIS OF BOTH HIPS: ICD-10-CM

## 2019-01-22 DIAGNOSIS — M76.30 IT BAND SYNDROME, UNSPECIFIED LATERALITY: ICD-10-CM

## 2019-01-22 DIAGNOSIS — M25.561 CHRONIC PAIN OF BOTH KNEES: ICD-10-CM

## 2019-01-22 DIAGNOSIS — Z96.653 STATUS POST TOTAL BILATERAL KNEE REPLACEMENT: ICD-10-CM

## 2019-01-22 DIAGNOSIS — M25.562 CHRONIC PAIN OF BOTH KNEES: ICD-10-CM

## 2019-01-22 DIAGNOSIS — G89.29 CHRONIC PAIN OF BOTH KNEES: ICD-10-CM

## 2019-01-22 PROCEDURE — 99204 OFFICE O/P NEW MOD 45 MIN: CPT | Performed by: PHYSICAL MEDICINE & REHABILITATION

## 2019-01-22 RX ORDER — DICLOFENAC SODIUM 75 MG/1
75 TABLET, DELAYED RELEASE ORAL 2 TIMES DAILY
Qty: 60 TABLET | Refills: 1 | Status: SHIPPED | OUTPATIENT
Start: 2019-01-22 | End: 2019-04-18

## 2019-01-22 NOTE — PATIENT INSTRUCTIONS
1) Diclofenac --take 1 tablet twice per day for the next two weeks and then as needed. NO more than two tablets per day.  OK to take with Prevacid  2) Continue with physical therapy  3) If no improvement after 6-8 weeks of therapy, then we can try the genic

## 2019-01-22 NOTE — H&P
[address] H&P    Requesting Physician: Abebe Escamilla    Chief Complaint (Reason for Visit):  Patient presents with:  Knee Pain: Referred by Dr Sandrita Looney pt is here for consult B knee pain no kirit Hypertension Mother        SOCIAL HISTORY:   Social History    Occupational History      Not on file    Tobacco Use      Smoking status: Never Smoker      Smokeless tobacco: Never Used    Substance and Sexual Activity      Alcohol use: No      Drug use: No Negative. All other systems reviewed and are negative. Pertinent positives and negatives noted in the HPI. PHYSICAL EXAM:   /80   Pulse 64   Ht 63\"   Wt 194 lb   BMI 34.37 kg/m²     Body mass index is 34.37 kg/m².       General: No immedi bilaterally      Gait:  Trendelenburg gait    Data  No visits with results within 6 Month(s) from this visit.    Latest known visit with results is:   Admission on 05/01/2018, Discharged on 05/04/2018   Component Date Value Ref Range Status   • Case Report 3.66* 3.70 - 5.40 M/UL Final   • HGB 05/03/2018 11.0* 12.0 - 16.0 g/dL Final   • HCT 05/03/2018 33.3* 35.0 - 48.0 % Final   • MCV 05/03/2018 90.8  80.0 - 100.0 fL Final   • MCH 05/03/2018 30.0  27.0 - 32.0 pg Final   • MCHC 05/03/2018 33.0  32.0 - 37.0 g/d Calcium, Total 05/04/2018 8.3* 8.5 - 10.5 mg/dL Final   • BUN/CREA Ratio 05/04/2018 24.1* 10.0 - 20.0 Final   • Anion Gap 05/04/2018 4  0 - 18 mmol/L Final   • Calculated Osmolality 05/04/2018 289  275 - 295 mOsm/kg Final   • GFR, Non- 05/0 Rehabilitation/Sports Liini 22

## 2019-01-23 PROBLEM — M25.562 CHRONIC PAIN OF BOTH KNEES: Status: ACTIVE | Noted: 2019-01-23

## 2019-01-23 PROBLEM — M25.561 CHRONIC PAIN OF BOTH KNEES: Status: ACTIVE | Noted: 2019-01-23

## 2019-01-23 PROBLEM — E66.811 CLASS 1 OBESITY DUE TO EXCESS CALORIES WITHOUT SERIOUS COMORBIDITY WITH BODY MASS INDEX (BMI) OF 34.0 TO 34.9 IN ADULT: Status: ACTIVE | Noted: 2019-01-23

## 2019-01-23 PROBLEM — M76.30 IT BAND SYNDROME, UNSPECIFIED LATERALITY: Status: ACTIVE | Noted: 2019-01-23

## 2019-01-23 PROBLEM — E66.09 CLASS 1 OBESITY DUE TO EXCESS CALORIES WITHOUT SERIOUS COMORBIDITY WITH BODY MASS INDEX (BMI) OF 34.0 TO 34.9 IN ADULT: Status: ACTIVE | Noted: 2019-01-23

## 2019-01-23 PROBLEM — Z96.653 STATUS POST TOTAL BILATERAL KNEE REPLACEMENT: Status: ACTIVE | Noted: 2019-01-23

## 2019-01-23 PROBLEM — M70.62 GREATER TROCHANTERIC BURSITIS OF BOTH HIPS: Status: ACTIVE | Noted: 2019-01-23

## 2019-01-23 PROBLEM — M70.61 GREATER TROCHANTERIC BURSITIS OF BOTH HIPS: Status: ACTIVE | Noted: 2019-01-23

## 2019-01-23 PROBLEM — G89.29 CHRONIC PAIN OF BOTH KNEES: Status: ACTIVE | Noted: 2019-01-23

## 2019-01-23 NOTE — TELEPHONE ENCOUNTER
Patient brought in CD with images of knees and shoulder. Unable to upload images onto pacs at this time. Will give to Dr. Elaine Urban to view.

## 2019-02-12 ENCOUNTER — MED REC SCAN ONLY (OUTPATIENT)
Dept: NEUROLOGY | Facility: CLINIC | Age: 64
End: 2019-02-12

## 2019-02-25 ENCOUNTER — OFFICE VISIT (OUTPATIENT)
Dept: INTERNAL MEDICINE CLINIC | Facility: CLINIC | Age: 64
End: 2019-02-25
Payer: MEDICARE

## 2019-02-25 VITALS
HEIGHT: 64 IN | RESPIRATION RATE: 17 BRPM | WEIGHT: 202.63 LBS | BODY MASS INDEX: 34.59 KG/M2 | SYSTOLIC BLOOD PRESSURE: 124 MMHG | OXYGEN SATURATION: 97 % | DIASTOLIC BLOOD PRESSURE: 82 MMHG | HEART RATE: 94 BPM | TEMPERATURE: 98 F

## 2019-02-25 DIAGNOSIS — Z79.899 OTHER LONG TERM (CURRENT) DRUG THERAPY: ICD-10-CM

## 2019-02-25 DIAGNOSIS — M25.569 KNEE PAIN, UNSPECIFIED CHRONICITY, UNSPECIFIED LATERALITY: ICD-10-CM

## 2019-02-25 DIAGNOSIS — Z51.81 THERAPEUTIC DRUG MONITORING: Primary | ICD-10-CM

## 2019-02-25 DIAGNOSIS — E66.9 CLASS 1 OBESITY WITH BODY MASS INDEX (BMI) OF 34.0 TO 34.9 IN ADULT, UNSPECIFIED OBESITY TYPE, UNSPECIFIED WHETHER SERIOUS COMORBIDITY PRESENT: ICD-10-CM

## 2019-02-25 DIAGNOSIS — D75.839 THROMBOCYTOSIS: ICD-10-CM

## 2019-02-25 DIAGNOSIS — Z83.3 FAMILY HISTORY OF DIABETES MELLITUS (DM): ICD-10-CM

## 2019-02-25 PROBLEM — E66.811 CLASS 1 OBESITY WITH BODY MASS INDEX (BMI) OF 34.0 TO 34.9 IN ADULT: Status: ACTIVE | Noted: 2019-02-25

## 2019-02-25 PROCEDURE — 99204 OFFICE O/P NEW MOD 45 MIN: CPT | Performed by: INTERNAL MEDICINE

## 2019-02-25 RX ORDER — TOPIRAMATE 25 MG/1
25 TABLET ORAL 2 TIMES DAILY
Qty: 60 TABLET | Refills: 1 | Status: SHIPPED | OUTPATIENT
Start: 2019-02-25 | End: 2019-03-06

## 2019-02-25 NOTE — PATIENT INSTRUCTIONS
Topiramate tablets  Brand Names: Topamax, Topiragen  What is this medicine? TOPIRAMATE (toe PYRE a mate) is used to treat seizures in adults or children with epilepsy. It is also used for the prevention of migraine headaches.   How should I use this medi This medicine may also interact with the following medications:  · acetazolamide  · alcohol  · amitriptyline  · aspirin and aspirin-like medicines  · birth control pills  · certain medicines for depression  · certain medicines for seizures  · certain medic Visit your doctor or health care professional for regular checks on your progress. Do not stop taking this medicine suddenly. This increases the risk of seizures if you are using this medicine to control epilepsy.  Wear a medical identification bracelet or This medicine may increase the chance of developing metabolic acidosis. If left untreated, this can cause kidney stones, bone disease, or slowed growth in children.  Symptoms include breathing fast, fatigue, loss of appetite, irregular heartbeat, or loss of Weight loss and maintenance can be a challenging endeavor. We want to celebrate your successes and support you if you encounter difficult times. Please don’t hesitate to ask us questions and share with us any struggles you may have.  For some patients, ther · Use smart phone applications to track your progress/ carbs intake eg:( my fitnesspal , loose it, Carb Manager )    · Have good night sleep 7-8 hours    · Reduce your stress level        How I Plan to Lose Weight      Goal setting is the [facility] of weight l

## 2019-02-25 NOTE — PROGRESS NOTES
Soniya Calles is a 75year old female. Chief complaint:  weight loss management and obesity related comorbidities    HPI:     Soniya Calles is a 75year old female new to our office today. referred by Dr. Niesha Couch  with [address] as listed b MG Oral Tab Take 500 mg by mouth every 6 (six) hours as needed for Pain. Disp:  Rfl:    Multiple Vitamins-Minerals (MULTIVITAL) Oral Tab Take 1 tablet by mouth daily.  Disp:  Rfl:    Cholecalciferol (VITAMIN D3) 1000 units Oral Cap Take 1 tablet by mouth da Status post total bilateral knee replacement              REVIEW OF SYSTEMS:   A comprehensive 10 point review of systems was completed.   Pertinent positives and negatives noted in the HPI      PHYSICAL EXAM:   /82 (BP Location: Right arm, Patient Po FERRITIN    (M25.569) Knee pain, unspecified chronicity, unspecified laterality  Plan: IRON AND TIBC, FERRITIN    (E66.9,  Z68.34) Class 1 obesity with body mass index (BMI) of 34.0 to 34.9 in adult, unspecified obesity type, unspecified whether serious co patient and/or on coordination of care. The diagnosis, prognosis, and general treatment was explained to the patient.   Please return to the clinic if you are having persistent or worsening symptoms   Martin Cobos MD,   Diplomate of the [employer] of

## 2019-02-26 ENCOUNTER — TELEPHONE (OUTPATIENT)
Dept: INTERNAL MEDICINE CLINIC | Facility: CLINIC | Age: 64
End: 2019-02-26

## 2019-02-27 NOTE — TELEPHONE ENCOUNTER
Can you please run PA   Or she can use good RX it is 3 dollars at [employer] or 7 dollars at [employer]   Please inform patient

## 2019-02-28 ENCOUNTER — TELEPHONE (OUTPATIENT)
Dept: INTERNAL MEDICINE CLINIC | Facility: CLINIC | Age: 64
End: 2019-02-28

## 2019-02-28 NOTE — TELEPHONE ENCOUNTER
Can  run PA  or she can use good RX it is 3 dollars at [employer] or 7 dollars at [employer].     Patient will try [facility]

## 2019-03-06 ENCOUNTER — TELEPHONE (OUTPATIENT)
Dept: NEUROLOGY | Facility: CLINIC | Age: 64
End: 2019-03-06

## 2019-03-06 ENCOUNTER — OFFICE VISIT (OUTPATIENT)
Dept: NEUROLOGY | Facility: CLINIC | Age: 64
End: 2019-03-06
Payer: MEDICARE

## 2019-03-06 VITALS — SYSTOLIC BLOOD PRESSURE: 122 MMHG | RESPIRATION RATE: 17 BRPM | HEART RATE: 100 BPM | DIASTOLIC BLOOD PRESSURE: 80 MMHG

## 2019-03-06 DIAGNOSIS — M25.561 CHRONIC PAIN OF BOTH KNEES: Primary | ICD-10-CM

## 2019-03-06 DIAGNOSIS — E66.09 CLASS 1 OBESITY DUE TO EXCESS CALORIES WITHOUT SERIOUS COMORBIDITY WITH BODY MASS INDEX (BMI) OF 34.0 TO 34.9 IN ADULT: ICD-10-CM

## 2019-03-06 DIAGNOSIS — Z96.653 STATUS POST TOTAL BILATERAL KNEE REPLACEMENT: ICD-10-CM

## 2019-03-06 DIAGNOSIS — G89.29 CHRONIC PAIN OF BOTH KNEES: Primary | ICD-10-CM

## 2019-03-06 DIAGNOSIS — M25.562 CHRONIC PAIN OF BOTH KNEES: Primary | ICD-10-CM

## 2019-03-06 PROCEDURE — 99214 OFFICE O/P EST MOD 30 MIN: CPT | Performed by: PHYSICAL MEDICINE & REHABILITATION

## 2019-03-06 RX ORDER — TOPIRAMATE 25 MG/1
25 TABLET ORAL 2 TIMES DAILY
Qty: 60 TABLET | Refills: 0 | Status: SHIPPED | OUTPATIENT
Start: 2019-03-06 | End: 2019-11-05

## 2019-03-06 RX ORDER — CHOLECALCIFEROL (VITAMIN D3) 50 MCG
2000 TABLET ORAL
COMMUNITY
End: 2019-03-06

## 2019-03-06 RX ORDER — AMOXICILLIN 500 MG/1
CAPSULE ORAL
Refills: 3 | COMMUNITY
Start: 2019-02-21 | End: 2019-07-01 | Stop reason: ALTCHOICE

## 2019-03-06 RX ORDER — MULTIVITAMIN
1 TABLET ORAL DAILY
COMMUNITY

## 2019-03-06 RX ORDER — TOPIRAMATE 25 MG/1
25 TABLET ORAL 2 TIMES DAILY
Qty: 60 TABLET | Refills: 0 | Status: SHIPPED | OUTPATIENT
Start: 2019-03-06 | End: 2019-03-06 | Stop reason: CLARIF

## 2019-03-06 NOTE — TELEPHONE ENCOUNTER
LVM: Patient states she received coupon through [facility] but states that [facility] never received Rx request. Please send prescription to [facility].

## 2019-03-06 NOTE — PROGRESS NOTES
[address]  Progress Note    CHIEF COMPLAINT:  Patient presents with:  Knee Pain: Pt is following up after a few weeks of PT, she states its going ok but shes still having a burning sensation in her knee.  St Rfl: 3   Diclofenac Sodium 1 % Transdermal Gel APPLY EXTERNALLY TO THE AFFECTED AREA FOUR TIMES DAILY Disp:  Rfl:    Multiple Vitamin Oral Tab Take 1 tablet by mouth.  Disp:  Rfl:    topiramate 25 MG Oral Tab Take 1 tablet (25 mg total) by mouth 2 (two) skylar Extra-occular movements intact. Ears: No auricular hematoma or deformities  Mouth: No lesions or ulcerations  Heart: peripheral pulses intact. Normal capillary refill.    Lungs: Non-labored respirations  Abdomen: No abdominal guarding  Extremities: No low Value:Surgical Pathology                                Case: [id number]                                  Authorizing Provider:  Alex Acuña MD       Collected:           05/01/2018 08:09 AM          Ordering Location:     [facility] - 15.0 % Final   • PLT 05/03/2018 344  140 - 400 K/UL Final   • MPV 05/03/2018 7.9  7.4 - 10.3 fL Final   • Glucose 05/03/2018 116* 70 - 99 mg/dL Final   • Sodium 05/03/2018 139  136 - 144 mmol/L Final   • Potassium 05/03/2018 3.8  3.3 - 5.1 mmol/L Final -American 05/04/2018 >60  >=60 Final   • Magnesium 05/04/2018 2.0  1.8 - 2.5 mg/dL Final   ]      Radiology Imaging:  I reviewed with the patient her X-ray of the knees right from 5/1/18    PROCEDURE:  XR KNEE (1 OR 2 VIEWS), RIGHT (CPT=73560)     C peritendinous patellar tendon bilaterally. RTC in 1-2 weeks before [place] dissections of the bilateral knees  Discharge Instructions were provided as documented in AVS summary. The patient was in agreement with the assessment and plan.   All questions

## 2019-03-06 NOTE — PATIENT INSTRUCTIONS
1) Hydrodisection of the BILATERAL patella tendon  2) Discussed prolotherapy vs Genicular nerve block in the future  3) Continue with physical therapy

## 2019-03-06 NOTE — TELEPHONE ENCOUNTER
Medicare Online for insurance coverage of BILATERAL knee hydrodisection under ultrasound guidance cpt code 20610 x 2. Insurance was verified and procedure is a covered benefit.   Will inform Nursing

## 2019-03-06 NOTE — TELEPHONE ENCOUNTER
Medicare Online for insurance coverage of BILATERAL genicular nerve blocks cpt codes 53989 x 3. Insurance was verified and procedure is a covered benefit and does not require authorization. Will inform Nursing.

## 2019-03-12 ENCOUNTER — MED REC SCAN ONLY (OUTPATIENT)
Dept: NEUROLOGY | Facility: CLINIC | Age: 64
End: 2019-03-12

## 2019-03-14 ENCOUNTER — OFFICE VISIT (OUTPATIENT)
Dept: NEUROLOGY | Facility: CLINIC | Age: 64
End: 2019-03-14
Payer: MEDICARE

## 2019-03-14 VITALS — HEART RATE: 97 BPM

## 2019-03-14 DIAGNOSIS — M76.51 PATELLAR TENDINITIS OF BOTH KNEES: ICD-10-CM

## 2019-03-14 DIAGNOSIS — M76.52 PATELLAR TENDINITIS OF BOTH KNEES: ICD-10-CM

## 2019-03-14 DIAGNOSIS — Z96.653 STATUS POST TOTAL BILATERAL KNEE REPLACEMENT: Primary | ICD-10-CM

## 2019-03-14 PROCEDURE — 20611 DRAIN/INJ JOINT/BURSA W/US: CPT | Performed by: PHYSICAL MEDICINE & REHABILITATION

## 2019-03-14 RX ORDER — LIDOCAINE HYDROCHLORIDE 10 MG/ML
3 INJECTION, SOLUTION INFILTRATION; PERINEURAL ONCE
Status: COMPLETED | OUTPATIENT
Start: 2019-03-14 | End: 2019-03-14

## 2019-03-14 RX ADMIN — LIDOCAINE HYDROCHLORIDE 3 ML: 10 INJECTION, SOLUTION INFILTRATION; PERINEURAL at 15:37:00

## 2019-03-14 RX ADMIN — LIDOCAINE HYDROCHLORIDE 3 ML: 10 INJECTION, SOLUTION INFILTRATION; PERINEURAL at 15:34:00

## 2019-03-14 NOTE — PROCEDURES
[address]   Knee Joint Injection Procedure Note    CHIEF COMPLAINT:  Patient presents with:   Injection: BILATERAL knee hydrodisection      PROCEDURE PERFORMED:  BILATERAL patella tendon hydro-discetion from Benign    Final   • POC Glucose  05/01/2018 173* 70 - 99 Final   • WBC 05/02/2018 14.5* 4.0 - 11.0 K/UL Final   • RBC 05/02/2018 3.91  3.70 - 5.40 M/UL Final   • HGB 05/02/2018 11.8* 12.0 - 16.0 g/dL Final   • HCT 05/02/2018 35.6  35.0 - 48.0 % Final   • M fL Final   • MCH 05/04/2018 30.0  27.0 - 32.0 pg Final   • MCHC 05/04/2018 33.1  32.0 - 37.0 g/dl Final   • RDW 05/04/2018 14.1  11.0 - 15.0 % Final   • PLT 05/04/2018 338  140 - 400 K/UL Final   • MPV 05/04/2018 7.6  7.4 - 10.3 fL Final   • Glucose 05/04/ The procedure, risks, benefits, and alternatives were discussed with the patient. The patient verbalized understanding and gave verbal consent. The LEFT knee was prepped and draped in the standard sterile fashion using 3 sticks of Betadine.  Using a li

## 2019-03-18 ENCOUNTER — TELEPHONE (OUTPATIENT)
Dept: INTERNAL MEDICINE CLINIC | Facility: CLINIC | Age: 64
End: 2019-03-18

## 2019-03-18 NOTE — TELEPHONE ENCOUNTER
Called to get current Rx card info so that Topiramate can be authorized. Turns out, patient DID go through [facility] and doesn't need an authorization at this time.

## 2019-04-16 ENCOUNTER — MED REC SCAN ONLY (OUTPATIENT)
Dept: NEUROLOGY | Facility: CLINIC | Age: 64
End: 2019-04-16

## 2019-04-17 ENCOUNTER — OFFICE VISIT (OUTPATIENT)
Dept: NEUROLOGY | Facility: CLINIC | Age: 64
End: 2019-04-17
Payer: MEDICARE

## 2019-04-17 VITALS
HEART RATE: 94 BPM | WEIGHT: 194 LBS | DIASTOLIC BLOOD PRESSURE: 80 MMHG | RESPIRATION RATE: 17 BRPM | BODY MASS INDEX: 33.12 KG/M2 | SYSTOLIC BLOOD PRESSURE: 142 MMHG | HEIGHT: 64 IN

## 2019-04-17 DIAGNOSIS — M25.561 CHRONIC PAIN OF BOTH KNEES: ICD-10-CM

## 2019-04-17 DIAGNOSIS — M76.52 PATELLAR TENDINITIS OF BOTH KNEES: ICD-10-CM

## 2019-04-17 DIAGNOSIS — G89.29 CHRONIC PAIN OF BOTH KNEES: ICD-10-CM

## 2019-04-17 DIAGNOSIS — M76.51 PATELLAR TENDINITIS OF BOTH KNEES: ICD-10-CM

## 2019-04-17 DIAGNOSIS — M25.562 CHRONIC PAIN OF BOTH KNEES: ICD-10-CM

## 2019-04-17 DIAGNOSIS — Z96.653 STATUS POST TOTAL BILATERAL KNEE REPLACEMENT: Primary | ICD-10-CM

## 2019-04-17 PROCEDURE — 99214 OFFICE O/P EST MOD 30 MIN: CPT | Performed by: PHYSICAL MEDICINE & REHABILITATION

## 2019-04-17 NOTE — PATIENT INSTRUCTIONS
1) Schedule the BILATERAL knee genicular nerve block under ultrasound --> 40 minutes  2) Continue with therapy and home exercise program

## 2019-04-17 NOTE — PROGRESS NOTES
[address]  Progress Note    CHIEF COMPLAINT:  Patient presents with:  Knee Pain: LOV 03/14/19 Pt is present following up after knee injection , pt states that the injections wasnt really helpful.  She states Transdermal Gel APPLY EXTERNALLY TO THE AFFECTED AREA FOUR TIMES DAILY Disp:  Rfl:    Multiple Vitamin Oral Tab Take 1 tablet by mouth. Disp:  Rfl:    topiramate 25 MG Oral Tab Take 1 tablet (25 mg total) by mouth 2 (two) times daily.  Disp: 60 tablet Rfl: deformities  Mouth: No lesions or ulcerations  Heart: peripheral pulses intact. Normal capillary refill. Lungs: Non-labored respirations  Abdomen: No abdominal guarding  Extremities: No lower extremity edema bilaterally   Skin: No lesions noted.    Cognit Authorizing Provider:  Kapil Urena MD       Collected:           05/01/2018 08:09 AM          Ordering Location:     [facility]          Received:            05/01/2018 10:31 AM                                 Operating Room Final   • Glucose 05/03/2018 116* 70 - 99 mg/dL Final   • Sodium 05/03/2018 139  136 - 144 mmol/L Final   • Potassium 05/03/2018 3.8  3.3 - 5.1 mmol/L Final   • Chloride 05/03/2018 106  95 - 110 mmol/L Final   • CO2 05/03/2018 29  22 - 32 mmol/L Final   • ]      Radiology Imaging:      ASSESSMENT AND PLAN:  The patient is a pleasant 28-year-old female who is coming in for follow-up of her bilateral knee pain status post total knee arthroplasties and Southold dissection of the fat pad on 3/14/2019.   She anup

## 2019-04-18 RX ORDER — DICLOFENAC SODIUM 75 MG/1
75 TABLET, DELAYED RELEASE ORAL 2 TIMES DAILY
Qty: 60 TABLET | Refills: 1 | Status: SHIPPED | OUTPATIENT
Start: 2019-04-18 | End: 2020-07-07

## 2019-04-22 ENCOUNTER — TELEPHONE (OUTPATIENT)
Dept: NEUROLOGY | Facility: CLINIC | Age: 64
End: 2019-04-22

## 2019-04-23 NOTE — TELEPHONE ENCOUNTER
Spoke to patient and advised genicular nerve block is approved and will schedule for office visit for procedure. See authorization for order written 3/6/19. Advised patient will have  call to schedule injection appointment.

## 2019-04-25 ENCOUNTER — OFFICE VISIT (OUTPATIENT)
Dept: NEUROLOGY | Facility: CLINIC | Age: 64
End: 2019-04-25
Payer: MEDICARE

## 2019-04-25 VITALS — SYSTOLIC BLOOD PRESSURE: 142 MMHG | DIASTOLIC BLOOD PRESSURE: 80 MMHG | HEART RATE: 98 BPM

## 2019-04-25 DIAGNOSIS — G89.29 CHRONIC PAIN OF BOTH KNEES: ICD-10-CM

## 2019-04-25 DIAGNOSIS — M25.561 CHRONIC PAIN OF BOTH KNEES: ICD-10-CM

## 2019-04-25 DIAGNOSIS — Z96.653 STATUS POST TOTAL BILATERAL KNEE REPLACEMENT: Primary | ICD-10-CM

## 2019-04-25 DIAGNOSIS — M25.562 CHRONIC PAIN OF BOTH KNEES: ICD-10-CM

## 2019-04-25 PROCEDURE — 64450 NJX AA&/STRD OTHER PN/BRANCH: CPT | Performed by: PHYSICAL MEDICINE & REHABILITATION

## 2019-04-25 RX ORDER — LIDOCAINE HYDROCHLORIDE 10 MG/ML
13 INJECTION, SOLUTION INFILTRATION; PERINEURAL ONCE
Status: COMPLETED | OUTPATIENT
Start: 2019-04-25 | End: 2019-04-25

## 2019-04-25 RX ADMIN — LIDOCAINE HYDROCHLORIDE 13 ML: 10 INJECTION, SOLUTION INFILTRATION; PERINEURAL at 11:58:00

## 2019-04-25 NOTE — PROCEDURES
[address]  Genicular Nerve Block Procedure Note    CHIEF COMPLAINT:  Patient presents with:   Injection: emilee knee      PROCEDURE PERFORMED:  BILATERAL Genicular nerve blocks under ultrasound guidance    ROBERT 05/02/2018 3.91  3.70 - 5.40 M/UL Final   • HGB 05/02/2018 11.8* 12.0 - 16.0 g/dL Final   • HCT 05/02/2018 35.6  35.0 - 48.0 % Final   • MCV 05/02/2018 91.0  80.0 - 100.0 fL Final   • MCH 05/02/2018 30.2  27.0 - 32.0 pg Final   • MCHC 05/02/2018 33.2  32. 0 05/04/2018 14.1  11.0 - 15.0 % Final   • PLT 05/04/2018 338  140 - 400 K/UL Final   • MPV 05/04/2018 7.6  7.4 - 10.3 fL Final   • Glucose 05/04/2018 113* 70 - 99 mg/dL Final   • Sodium 05/04/2018 139  136 - 144 mmol/L Final   • Potassium 05/04/2018 3.7  3. prepped and draped in the sterile fashion using 2 sticks of Betadine. Then a high-frequency linear probe was used to identify the genicular arteries at each of these locations.  Once identified, 1 cc of 1% lidocaine was injected next to the artery abutting

## 2019-04-26 ENCOUNTER — TELEPHONE (OUTPATIENT)
Dept: NEUROLOGY | Facility: CLINIC | Age: 64
End: 2019-04-26

## 2019-04-26 NOTE — TELEPHONE ENCOUNTER
Pt states that she has some signs of improvement. But this morning, pain is back to 8. She has questions regarding an RFA procedure.      Please call back   [phone]

## 2019-04-26 NOTE — TELEPHONE ENCOUNTER
Pt calling stating she would like to talk to either Dr Peyton Snow or nurse.  Pt states to call on [phone]

## 2019-04-29 ENCOUNTER — OFFICE VISIT (OUTPATIENT)
Dept: INTERNAL MEDICINE CLINIC | Facility: CLINIC | Age: 64
End: 2019-04-29
Payer: MEDICARE

## 2019-04-29 VITALS
BODY MASS INDEX: 34.35 KG/M2 | SYSTOLIC BLOOD PRESSURE: 110 MMHG | RESPIRATION RATE: 17 BRPM | HEIGHT: 64 IN | HEART RATE: 91 BPM | OXYGEN SATURATION: 99 % | DIASTOLIC BLOOD PRESSURE: 72 MMHG | TEMPERATURE: 99 F | WEIGHT: 201.19 LBS

## 2019-04-29 DIAGNOSIS — E66.9 CLASS 1 OBESITY WITH BODY MASS INDEX (BMI) OF 34.0 TO 34.9 IN ADULT, UNSPECIFIED OBESITY TYPE, UNSPECIFIED WHETHER SERIOUS COMORBIDITY PRESENT: ICD-10-CM

## 2019-04-29 DIAGNOSIS — D75.839 THROMBOCYTOSIS: ICD-10-CM

## 2019-04-29 DIAGNOSIS — Z51.81 THERAPEUTIC DRUG MONITORING: Primary | ICD-10-CM

## 2019-04-29 PROCEDURE — 99213 OFFICE O/P EST LOW 20 MIN: CPT | Performed by: INTERNAL MEDICINE

## 2019-04-29 NOTE — PROGRESS NOTES
Darlene Whaley is a 75year old female.     Chief complaint:weight loss follow up , medication refill    HPI:   Gildardo Guadarrama here for follow up on weight loss   Wt Readings from Last 12 Encounters:  04/29/19 : 201 lb 3.2 oz  04/17/19 : 194 lb  02/25/1 Fexofenadine HCl 180 MG Oral Tab Take 180 mg by mouth as needed. Disp:  Rfl:    lansoprazole 30 MG Oral Capsule Delayed Release Take 30 mg by mouth as needed.  Disp:  Rfl:    famotidine 10 MG Oral Tab Take 10 mg by mouth 2 (two) times daily as needed for no rashes,no suspicious lesions  HEENT: atraumatic, normocephalic,ears and throat are clear  NECK: supple,no adenopathy  LUNGS: clear to auscultation  CARDIO: RRR without murmur  GI: good BS's,no masses, HSM or tenderness  EXTREMITIES: no cyanosis, clubbin time  6.  Treatment plan       Please return to the clinic if you are having persistent or worsening symptoms   Mervin Delacruz MD,   Diplomate of the American Board of Internal Medicine  Diplomate of the American Board of Obesity Medicine

## 2019-04-30 ENCOUNTER — TELEPHONE (OUTPATIENT)
Dept: NEUROLOGY | Facility: CLINIC | Age: 64
End: 2019-04-30

## 2019-04-30 NOTE — TELEPHONE ENCOUNTER
Called [email or web address] to verify  If BILATERAL knee genicular nerve block under ultrasound guidance is a covered benefit. States it is NOT a covered benefit. not medically necessary, considered experimental. BILATERAL knee genicular nerve block can be d

## 2019-05-01 NOTE — TELEPHONE ENCOUNTER
Spoke to Dr Cipriano Vazquez. 2 orders placed for genicular nerve block, on 3/6/19, and 4/17/19. Order placed on 3/6/19 authorized, order written 4/17/19 not authorized.

## 2019-05-08 ENCOUNTER — MED REC SCAN ONLY (OUTPATIENT)
Dept: NEUROLOGY | Facility: CLINIC | Age: 64
End: 2019-05-08

## 2019-05-15 ENCOUNTER — OFFICE VISIT (OUTPATIENT)
Dept: NEUROLOGY | Facility: CLINIC | Age: 64
End: 2019-05-15
Payer: MEDICARE

## 2019-05-15 VITALS — RESPIRATION RATE: 18 BRPM | HEART RATE: 101 BPM | WEIGHT: 201 LBS | HEIGHT: 64 IN | BODY MASS INDEX: 34.31 KG/M2

## 2019-05-15 DIAGNOSIS — M76.51 PATELLAR TENDINITIS OF BOTH KNEES: Primary | ICD-10-CM

## 2019-05-15 DIAGNOSIS — M25.561 CHRONIC PAIN OF BOTH KNEES: ICD-10-CM

## 2019-05-15 DIAGNOSIS — M25.562 CHRONIC PAIN OF BOTH KNEES: ICD-10-CM

## 2019-05-15 DIAGNOSIS — E66.09 CLASS 1 OBESITY DUE TO EXCESS CALORIES WITHOUT SERIOUS COMORBIDITY WITH BODY MASS INDEX (BMI) OF 34.0 TO 34.9 IN ADULT: ICD-10-CM

## 2019-05-15 DIAGNOSIS — Z96.653 STATUS POST TOTAL BILATERAL KNEE REPLACEMENT: ICD-10-CM

## 2019-05-15 DIAGNOSIS — M76.52 PATELLAR TENDINITIS OF BOTH KNEES: Primary | ICD-10-CM

## 2019-05-15 DIAGNOSIS — G89.29 CHRONIC PAIN OF BOTH KNEES: ICD-10-CM

## 2019-05-15 PROCEDURE — 99214 OFFICE O/P EST MOD 30 MIN: CPT | Performed by: PHYSICAL MEDICINE & REHABILITATION

## 2019-05-15 NOTE — PROGRESS NOTES
[address]  Progress Note    CHIEF COMPLAINT:  Patient presents with:  Knee Pain: LOV 04/25/19 Pt states that shes here to discuss some medication       History of Present Illness:   The patient is a 75 year • Diabetes Mother    • Diabetes Father        CURRENT MEDICATIONS:     Current Outpatient Medications:  Diclofenac Sodium 75 MG Oral Tab EC Take 1 tablet (75 mg total) by mouth 2 (two) times daily.  Take with food for 2 weeks as directed and then as neede is 34.5 kg/m². General: No immediate distress  Head: Normocephalic/ Atraumatic  Eyes: Extra-occular movements intact. Ears: No auricular hematoma or deformities  Mouth: No lesions or ulcerations  Heart: peripheral pulses intact.  Normal capillary ref Component Date Value Ref Range Status   • Case Report 05/01/2018    Final                    Value:Surgical Pathology                                Case: [id number]                                  Authorizing Provider:  Kiki Cotton MD       [facility] - 32.0 pg Final   • MCHC 05/03/2018 33.0  32.0 - 37.0 g/dl Final   • RDW 05/03/2018 13.9  11.0 - 15.0 % Final   • PLT 05/03/2018 344  140 - 400 K/UL Final   • MPV 05/03/2018 7.9  7.4 - 10.3 fL Final   • Glucose 05/03/2018 116* 70 - 99 mg/dL Final   • Kerry 295 mOsm/kg Final   • GFR, Non- 05/04/2018 >60  >=60 Final   • GFR, -American 05/04/2018 >60  >=60 Final   • Magnesium 05/04/2018 2.0  1.8 - 2.5 mg/dL Final   ]      Radiology Imaging:        ASSESSMENT AND PLAN:  The patient is a pl

## 2019-06-17 ENCOUNTER — HOSPITAL ENCOUNTER (OUTPATIENT)
Dept: CT IMAGING | Facility: HOSPITAL | Age: 64
Discharge: HOME | End: 2019-06-17
Attending: ORTHOPAEDIC SURGERY
Payer: MEDICARE

## 2019-06-17 DIAGNOSIS — T84.84XA PAINFUL ORTHOPAEDIC HARDWARE (HCC): ICD-10-CM

## 2019-06-17 PROCEDURE — 73700 CT LOWER EXTREMITY W/O DYE: CPT | Performed by: ORTHOPAEDIC SURGERY

## 2019-06-19 ENCOUNTER — LAB ENCOUNTER (OUTPATIENT)
Dept: LAB | Facility: HOSPITAL | Age: 64
End: 2019-06-19
Attending: ORTHOPAEDIC SURGERY
Payer: MEDICARE

## 2019-06-19 DIAGNOSIS — T84.033A: Primary | ICD-10-CM

## 2019-06-19 DIAGNOSIS — T84.032A: Primary | ICD-10-CM

## 2019-06-19 PROCEDURE — 89050 BODY FLUID CELL COUNT: CPT

## 2019-06-19 PROCEDURE — 89060 EXAM SYNOVIAL FLUID CRYSTALS: CPT

## 2019-06-19 PROCEDURE — 89051 BODY FLUID CELL COUNT: CPT

## 2019-06-19 PROCEDURE — 87070 CULTURE OTHR SPECIMN AEROBIC: CPT

## 2019-06-19 PROCEDURE — 87205 SMEAR GRAM STAIN: CPT

## 2019-07-01 ENCOUNTER — OFFICE VISIT (OUTPATIENT)
Dept: INTERNAL MEDICINE CLINIC | Facility: CLINIC | Age: 64
End: 2019-07-01
Payer: MEDICARE

## 2019-07-01 VITALS
BODY MASS INDEX: 33.63 KG/M2 | HEIGHT: 64 IN | SYSTOLIC BLOOD PRESSURE: 134 MMHG | DIASTOLIC BLOOD PRESSURE: 86 MMHG | HEART RATE: 93 BPM | OXYGEN SATURATION: 98 % | RESPIRATION RATE: 18 BRPM | WEIGHT: 197 LBS | TEMPERATURE: 99 F

## 2019-07-01 DIAGNOSIS — M25.569 KNEE PAIN, UNSPECIFIED CHRONICITY, UNSPECIFIED LATERALITY: ICD-10-CM

## 2019-07-01 DIAGNOSIS — E66.9 CLASS 1 OBESITY WITH BODY MASS INDEX (BMI) OF 34.0 TO 34.9 IN ADULT, UNSPECIFIED OBESITY TYPE, UNSPECIFIED WHETHER SERIOUS COMORBIDITY PRESENT: ICD-10-CM

## 2019-07-01 DIAGNOSIS — Z51.81 THERAPEUTIC DRUG MONITORING: Primary | ICD-10-CM

## 2019-07-01 PROCEDURE — 99213 OFFICE O/P EST LOW 20 MIN: CPT | Performed by: INTERNAL MEDICINE

## 2019-07-01 RX ORDER — GABAPENTIN 300 MG/1
CAPSULE ORAL
Refills: 0 | COMMUNITY
Start: 2019-05-17 | End: 2019-11-05

## 2019-07-01 RX ORDER — MELOXICAM 7.5 MG/1
TABLET ORAL
Refills: 5 | COMMUNITY
Start: 2019-06-23 | End: 2020-07-07

## 2019-07-01 NOTE — PROGRESS NOTES
Usman Jha is a 75year old female.     Chief complaint:weight loss follow up , medication refill, therapeutic drug monitoring    HPI:   Gillian Adams here for follow up on weight loss   Wt Readings from Last 12 Encounters:  07/01/19 : 197 lb  05/15/19 : needed for Pain. Disp:  Rfl:    PEG 3350 Oral Powd Pack Take 17 g by mouth daily. Disp:  Rfl:    naproxen 500 MG Oral Tab Take 1 tablet (500 mg total) by mouth 2 (two) times daily with meals. Take with food.   Stop if upset stomach Disp: 60 tablet Rfl: 0 negatives noted in the the HPI          PHYSICAL EXAM:   /86 (BP Location: Right arm, Patient Position: Sitting, Cuff Size: adult)   Pulse 106   Temp 98.6 °F (37 °C) (Oral)   Resp 18   Ht 64\"   Wt 197 lb   SpO2 98%   BMI 33.81 kg/m²   GENERAL: well habits/challenges       Reviewed:  1. Nutrition and the importance of regular protein intake  2. Labs ordered:    No   3. Hidden CHO/carbohydrate sources  4. Alcohol as possible source of hidden/amnesia calories and its effects  5.  Importance of physical a

## 2019-07-17 ENCOUNTER — LAB ENCOUNTER (OUTPATIENT)
Dept: LAB | Facility: HOSPITAL | Age: 64
End: 2019-07-17
Attending: ORTHOPAEDIC SURGERY
Payer: MEDICARE

## 2019-07-17 DIAGNOSIS — Z96.659 PAINFUL TOTAL KNEE REPLACEMENT (HCC): Primary | ICD-10-CM

## 2019-07-17 DIAGNOSIS — T84.84XA PAINFUL TOTAL KNEE REPLACEMENT (HCC): Primary | ICD-10-CM

## 2019-07-17 LAB
CRP SERPL-MCNC: 0.42 MG/DL (ref ?–0.3)
ERYTHROCYTE [SEDIMENTATION RATE] IN BLOOD: 12 MM/HR (ref 0–30)

## 2019-07-17 PROCEDURE — 85652 RBC SED RATE AUTOMATED: CPT

## 2019-07-17 PROCEDURE — 36415 COLL VENOUS BLD VENIPUNCTURE: CPT

## 2019-07-17 PROCEDURE — 86140 C-REACTIVE PROTEIN: CPT

## 2019-11-06 ENCOUNTER — LAB ENCOUNTER (OUTPATIENT)
Dept: LAB | Facility: HOSPITAL | Age: 64
End: 2019-11-06
Attending: ORTHOPAEDIC SURGERY
Payer: MEDICARE

## 2019-11-06 DIAGNOSIS — Z01.818 PREOP TESTING: ICD-10-CM

## 2019-11-06 PROCEDURE — 86900 BLOOD TYPING SEROLOGIC ABO: CPT

## 2019-11-06 PROCEDURE — 86850 RBC ANTIBODY SCREEN: CPT

## 2019-11-06 PROCEDURE — 86901 BLOOD TYPING SEROLOGIC RH(D): CPT

## 2019-11-06 PROCEDURE — 36415 COLL VENOUS BLD VENIPUNCTURE: CPT

## 2019-11-08 ENCOUNTER — APPOINTMENT (OUTPATIENT)
Dept: LAB | Facility: HOSPITAL | Age: 64
End: 2019-11-08
Attending: ORTHOPAEDIC SURGERY
Payer: MEDICARE

## 2019-11-08 DIAGNOSIS — Z01.818 PREOP TESTING: ICD-10-CM

## 2019-11-08 PROCEDURE — 80053 COMPREHEN METABOLIC PANEL: CPT

## 2019-11-08 PROCEDURE — 36415 COLL VENOUS BLD VENIPUNCTURE: CPT

## 2019-11-11 NOTE — H&P
[place] Angel Jeronimo Patient Status:  Surgery Admit Jean Collins    1955 MRN [id number]   [place] [address] Attending Lavonne Campo, *   Hosp Day # 0 PCP Oralia Workman appears stated age and cooperative  Extremities: There is infusions in both knees. She has full extension 115 degrees of flexion bilaterally with neutral alignment.   The knees are stable in extension having increased laxity with varus stress in mid flexio

## 2019-11-12 ENCOUNTER — APPOINTMENT (OUTPATIENT)
Dept: GENERAL RADIOLOGY | Facility: HOSPITAL | Age: 64
DRG: 468 | End: 2019-11-12
Attending: PHYSICIAN ASSISTANT
Payer: MEDICARE

## 2019-11-12 ENCOUNTER — ANESTHESIA EVENT (OUTPATIENT)
Dept: SURGERY | Facility: HOSPITAL | Age: 64
DRG: 468 | End: 2019-11-12
Payer: MEDICARE

## 2019-11-12 ENCOUNTER — HOSPITAL ENCOUNTER (INPATIENT)
Facility: HOSPITAL | Age: 64
LOS: 2 days | Discharge: HOME HEALTH | DRG: 468 | End: 2019-11-14
Attending: ORTHOPAEDIC SURGERY | Admitting: ORTHOPAEDIC SURGERY
Payer: MEDICARE

## 2019-11-12 ENCOUNTER — ANESTHESIA (OUTPATIENT)
Dept: SURGERY | Facility: HOSPITAL | Age: 64
DRG: 468 | End: 2019-11-12
Payer: MEDICARE

## 2019-11-12 DIAGNOSIS — Z01.818 PREOP TESTING: Primary | ICD-10-CM

## 2019-11-12 PROBLEM — T84.018A FAILED TOTAL KNEE ARTHROPLASTY (HCC): Status: ACTIVE | Noted: 2019-11-12

## 2019-11-12 PROBLEM — Z96.659 FAILED TOTAL KNEE ARTHROPLASTY: Status: ACTIVE | Noted: 2019-11-12

## 2019-11-12 PROBLEM — T84.018A FAILED TOTAL KNEE ARTHROPLASTY: Status: ACTIVE | Noted: 2019-11-12

## 2019-11-12 PROBLEM — Z96.659 FAILED TOTAL KNEE ARTHROPLASTY (HCC): Status: ACTIVE | Noted: 2019-11-12

## 2019-11-12 PROCEDURE — 0SRD0J9 REPLACEMENT OF LEFT KNEE JOINT WITH SYNTHETIC SUBSTITUTE, CEMENTED, OPEN APPROACH: ICD-10-PCS | Performed by: ORTHOPAEDIC SURGERY

## 2019-11-12 PROCEDURE — 99232 SBSQ HOSP IP/OBS MODERATE 35: CPT | Performed by: HOSPITALIST

## 2019-11-12 PROCEDURE — 73560 X-RAY EXAM OF KNEE 1 OR 2: CPT | Performed by: PHYSICIAN ASSISTANT

## 2019-11-12 PROCEDURE — 0SPD0JZ REMOVAL OF SYNTHETIC SUBSTITUTE FROM LEFT KNEE JOINT, OPEN APPROACH: ICD-10-PCS | Performed by: ORTHOPAEDIC SURGERY

## 2019-11-12 DEVICE — IMPLANTABLE DEVICE: Type: IMPLANTABLE DEVICE | Site: KNEE | Status: FUNCTIONAL

## 2019-11-12 DEVICE — NEX A/P WDGD PRCT TIB PLT SZ 3: Type: IMPLANTABLE DEVICE | Site: KNEE | Status: FUNCTIONAL

## 2019-11-12 DEVICE — REFOBACIN BC R 1X40 US: Type: IMPLANTABLE DEVICE | Site: KNEE | Status: FUNCTIONAL

## 2019-11-12 DEVICE — NEX PRECT AGMT BLK POST E 5: Type: IMPLANTABLE DEVICE | Site: KNEE | Status: FUNCTIONAL

## 2019-11-12 DEVICE — NEX LCCK FEM SZ E-LT: Type: IMPLANTABLE DEVICE | Site: KNEE | Status: FUNCTIONAL

## 2019-11-12 DEVICE — NEX DIST PRE AGMT BLK E 5: Type: IMPLANTABLE DEVICE | Site: KNEE | Status: FUNCTIONAL

## 2019-11-12 RX ORDER — DEXAMETHASONE SODIUM PHOSPHATE 4 MG/ML
VIAL (ML) INJECTION AS NEEDED
Status: DISCONTINUED | OUTPATIENT
Start: 2019-11-12 | End: 2019-11-12 | Stop reason: SURG

## 2019-11-12 RX ORDER — SODIUM CHLORIDE, SODIUM LACTATE, POTASSIUM CHLORIDE, CALCIUM CHLORIDE 600; 310; 30; 20 MG/100ML; MG/100ML; MG/100ML; MG/100ML
INJECTION, SOLUTION INTRAVENOUS CONTINUOUS
Status: DISCONTINUED | OUTPATIENT
Start: 2019-11-12 | End: 2019-11-14

## 2019-11-12 RX ORDER — ONDANSETRON 2 MG/ML
INJECTION INTRAMUSCULAR; INTRAVENOUS AS NEEDED
Status: DISCONTINUED | OUTPATIENT
Start: 2019-11-12 | End: 2019-11-12 | Stop reason: SURG

## 2019-11-12 RX ORDER — MORPHINE SULFATE 1 MG/ML
INJECTION, SOLUTION EPIDURAL; INTRATHECAL; INTRAVENOUS AS NEEDED
Status: DISCONTINUED | OUTPATIENT
Start: 2019-11-12 | End: 2019-11-12 | Stop reason: SURG

## 2019-11-12 RX ORDER — HYDROCODONE BITARTRATE AND ACETAMINOPHEN 7.5; 325 MG/1; MG/1
1 TABLET ORAL EVERY 4 HOURS PRN
Status: DISCONTINUED | OUTPATIENT
Start: 2019-11-12 | End: 2019-11-14

## 2019-11-12 RX ORDER — HALOPERIDOL 5 MG/ML
0.5 INJECTION INTRAMUSCULAR ONCE AS NEEDED
Status: ACTIVE | OUTPATIENT
Start: 2019-11-12 | End: 2019-11-12

## 2019-11-12 RX ORDER — BUPIVACAINE HYDROCHLORIDE 5 MG/ML
INJECTION, SOLUTION EPIDURAL; INTRACAUDAL AS NEEDED
Status: DISCONTINUED | OUTPATIENT
Start: 2019-11-12 | End: 2019-11-12 | Stop reason: HOSPADM

## 2019-11-12 RX ORDER — LIDOCAINE HYDROCHLORIDE 10 MG/ML
INJECTION, SOLUTION EPIDURAL; INFILTRATION; INTRACAUDAL; PERINEURAL AS NEEDED
Status: DISCONTINUED | OUTPATIENT
Start: 2019-11-12 | End: 2019-11-12 | Stop reason: SURG

## 2019-11-12 RX ORDER — NITROFURANTOIN 25; 75 MG/1; MG/1
100 CAPSULE ORAL 2 TIMES DAILY
COMMUNITY
End: 2020-07-07

## 2019-11-12 RX ORDER — PROCHLORPERAZINE EDISYLATE 5 MG/ML
5 INJECTION INTRAMUSCULAR; INTRAVENOUS ONCE AS NEEDED
Status: ACTIVE | OUTPATIENT
Start: 2019-11-12 | End: 2019-11-12

## 2019-11-12 RX ORDER — MORPHINE SULFATE 4 MG/ML
4 INJECTION, SOLUTION INTRAMUSCULAR; INTRAVENOUS EVERY 2 HOUR PRN
Status: DISCONTINUED | OUTPATIENT
Start: 2019-11-12 | End: 2019-11-14

## 2019-11-12 RX ORDER — METOCLOPRAMIDE HYDROCHLORIDE 5 MG/ML
10 INJECTION INTRAMUSCULAR; INTRAVENOUS EVERY 6 HOURS PRN
Status: DISCONTINUED | OUTPATIENT
Start: 2019-11-12 | End: 2019-11-14

## 2019-11-12 RX ORDER — ONDANSETRON 2 MG/ML
4 INJECTION INTRAMUSCULAR; INTRAVENOUS ONCE AS NEEDED
Status: ACTIVE | OUTPATIENT
Start: 2019-11-12 | End: 2019-11-12

## 2019-11-12 RX ORDER — FAMOTIDINE 20 MG/1
20 TABLET ORAL ONCE
Status: COMPLETED | OUTPATIENT
Start: 2019-11-12 | End: 2019-11-12

## 2019-11-12 RX ORDER — ONDANSETRON 2 MG/ML
4 INJECTION INTRAMUSCULAR; INTRAVENOUS EVERY 4 HOURS PRN
Status: DISCONTINUED | OUTPATIENT
Start: 2019-11-12 | End: 2019-11-14

## 2019-11-12 RX ORDER — BISACODYL 10 MG
10 SUPPOSITORY, RECTAL RECTAL
Status: DISCONTINUED | OUTPATIENT
Start: 2019-11-12 | End: 2019-11-14

## 2019-11-12 RX ORDER — DIPHENHYDRAMINE HCL 25 MG
25 CAPSULE ORAL EVERY 4 HOURS PRN
Status: DISCONTINUED | OUTPATIENT
Start: 2019-11-12 | End: 2019-11-14

## 2019-11-12 RX ORDER — DOCUSATE SODIUM 100 MG/1
100 CAPSULE, LIQUID FILLED ORAL 2 TIMES DAILY
Status: DISCONTINUED | OUTPATIENT
Start: 2019-11-12 | End: 2019-11-14

## 2019-11-12 RX ORDER — BUPIVACAINE HYDROCHLORIDE 7.5 MG/ML
INJECTION, SOLUTION INTRASPINAL AS NEEDED
Status: DISCONTINUED | OUTPATIENT
Start: 2019-11-12 | End: 2019-11-12 | Stop reason: SURG

## 2019-11-12 RX ORDER — NALOXONE HYDROCHLORIDE 0.4 MG/ML
0.08 INJECTION, SOLUTION INTRAMUSCULAR; INTRAVENOUS; SUBCUTANEOUS
Status: ACTIVE | OUTPATIENT
Start: 2019-11-12 | End: 2019-11-13

## 2019-11-12 RX ORDER — MORPHINE SULFATE 2 MG/ML
2 INJECTION, SOLUTION INTRAMUSCULAR; INTRAVENOUS EVERY 2 HOUR PRN
Status: DISCONTINUED | OUTPATIENT
Start: 2019-11-12 | End: 2019-11-14

## 2019-11-12 RX ORDER — MIDAZOLAM HYDROCHLORIDE 1 MG/ML
INJECTION INTRAMUSCULAR; INTRAVENOUS AS NEEDED
Status: DISCONTINUED | OUTPATIENT
Start: 2019-11-12 | End: 2019-11-12 | Stop reason: SURG

## 2019-11-12 RX ORDER — PANTOPRAZOLE SODIUM 40 MG/1
40 TABLET, DELAYED RELEASE ORAL
Status: DISCONTINUED | OUTPATIENT
Start: 2019-11-13 | End: 2019-11-14

## 2019-11-12 RX ORDER — DIPHENHYDRAMINE HYDROCHLORIDE 50 MG/ML
12.5 INJECTION INTRAMUSCULAR; INTRAVENOUS EVERY 4 HOURS PRN
Status: DISCONTINUED | OUTPATIENT
Start: 2019-11-12 | End: 2019-11-14

## 2019-11-12 RX ORDER — HYDROCODONE BITARTRATE AND ACETAMINOPHEN 7.5; 325 MG/1; MG/1
1 TABLET ORAL EVERY 6 HOURS PRN
Status: ACTIVE | OUTPATIENT
Start: 2019-11-12 | End: 2019-11-13

## 2019-11-12 RX ORDER — SODIUM PHOSPHATE, DIBASIC AND SODIUM PHOSPHATE, MONOBASIC 7; 19 G/133ML; G/133ML
1 ENEMA RECTAL ONCE AS NEEDED
Status: DISCONTINUED | OUTPATIENT
Start: 2019-11-12 | End: 2019-11-14

## 2019-11-12 RX ORDER — CELECOXIB 200 MG/1
200 CAPSULE ORAL EVERY 12 HOURS SCHEDULED
Status: DISCONTINUED | OUTPATIENT
Start: 2019-11-12 | End: 2019-11-14

## 2019-11-12 RX ORDER — DEXTROSE, SODIUM CHLORIDE, AND POTASSIUM CHLORIDE 5; .45; .15 G/100ML; G/100ML; G/100ML
INJECTION INTRAVENOUS CONTINUOUS
Status: DISCONTINUED | OUTPATIENT
Start: 2019-11-12 | End: 2019-11-14

## 2019-11-12 RX ORDER — FAMOTIDINE 10 MG/ML
20 INJECTION, SOLUTION INTRAVENOUS 2 TIMES DAILY
Status: DISCONTINUED | OUTPATIENT
Start: 2019-11-12 | End: 2019-11-14

## 2019-11-12 RX ORDER — DIPHENHYDRAMINE HYDROCHLORIDE 50 MG/ML
25 INJECTION INTRAMUSCULAR; INTRAVENOUS ONCE AS NEEDED
Status: ACTIVE | OUTPATIENT
Start: 2019-11-12 | End: 2019-11-12

## 2019-11-12 RX ORDER — SENNOSIDES 8.6 MG
17.2 TABLET ORAL NIGHTLY
Status: DISCONTINUED | OUTPATIENT
Start: 2019-11-12 | End: 2019-11-14

## 2019-11-12 RX ORDER — ACETAMINOPHEN 325 MG/1
650 TABLET ORAL EVERY 4 HOURS PRN
Status: DISCONTINUED | OUTPATIENT
Start: 2019-11-12 | End: 2019-11-14

## 2019-11-12 RX ORDER — POLYETHYLENE GLYCOL 3350 17 G/17G
17 POWDER, FOR SOLUTION ORAL DAILY PRN
Status: DISCONTINUED | OUTPATIENT
Start: 2019-11-12 | End: 2019-11-14

## 2019-11-12 RX ORDER — HYDROMORPHONE HYDROCHLORIDE 1 MG/ML
0.4 INJECTION, SOLUTION INTRAMUSCULAR; INTRAVENOUS; SUBCUTANEOUS
Status: ACTIVE | OUTPATIENT
Start: 2019-11-12 | End: 2019-11-13

## 2019-11-12 RX ORDER — GABAPENTIN 300 MG/1
300 CAPSULE ORAL NIGHTLY
Status: DISCONTINUED | OUTPATIENT
Start: 2019-11-12 | End: 2019-11-14

## 2019-11-12 RX ORDER — NALBUPHINE HCL 10 MG/ML
2.5 AMPUL (ML) INJECTION EVERY 4 HOURS PRN
Status: ACTIVE | OUTPATIENT
Start: 2019-11-12 | End: 2019-11-13

## 2019-11-12 RX ORDER — MORPHINE SULFATE 2 MG/ML
1 INJECTION, SOLUTION INTRAMUSCULAR; INTRAVENOUS EVERY 2 HOUR PRN
Status: DISCONTINUED | OUTPATIENT
Start: 2019-11-12 | End: 2019-11-14

## 2019-11-12 RX ORDER — ACETAMINOPHEN 325 MG/1
650 TABLET ORAL EVERY 6 HOURS PRN
Status: ACTIVE | OUTPATIENT
Start: 2019-11-12 | End: 2019-11-13

## 2019-11-12 RX ORDER — HYDROCODONE BITARTRATE AND ACETAMINOPHEN 7.5; 325 MG/1; MG/1
2 TABLET ORAL EVERY 6 HOURS PRN
Status: ACTIVE | OUTPATIENT
Start: 2019-11-12 | End: 2019-11-13

## 2019-11-12 RX ORDER — MELATONIN
325
Status: DISCONTINUED | OUTPATIENT
Start: 2019-11-13 | End: 2019-11-14

## 2019-11-12 RX ORDER — DIPHENHYDRAMINE HCL 25 MG
25 CAPSULE ORAL EVERY 4 HOURS PRN
Status: ACTIVE | OUTPATIENT
Start: 2019-11-12 | End: 2019-11-13

## 2019-11-12 RX ORDER — GABAPENTIN 600 MG/1
600 TABLET ORAL ONCE
Status: COMPLETED | OUTPATIENT
Start: 2019-11-12 | End: 2019-11-12

## 2019-11-12 RX ORDER — HYDROCODONE BITARTRATE AND ACETAMINOPHEN 7.5; 325 MG/1; MG/1
2 TABLET ORAL EVERY 4 HOURS PRN
Status: DISCONTINUED | OUTPATIENT
Start: 2019-11-12 | End: 2019-11-14

## 2019-11-12 RX ORDER — CELECOXIB 200 MG/1
400 CAPSULE ORAL ONCE
Status: COMPLETED | OUTPATIENT
Start: 2019-11-12 | End: 2019-11-12

## 2019-11-12 RX ORDER — SODIUM CHLORIDE 0.9 % (FLUSH) 0.9 %
10 SYRINGE (ML) INJECTION AS NEEDED
Status: DISCONTINUED | OUTPATIENT
Start: 2019-11-12 | End: 2019-11-14

## 2019-11-12 RX ORDER — CEFAZOLIN SODIUM/WATER 2 G/20 ML
2 SYRINGE (ML) INTRAVENOUS ONCE
Status: COMPLETED | OUTPATIENT
Start: 2019-11-12 | End: 2019-11-12

## 2019-11-12 RX ORDER — FAMOTIDINE 20 MG/1
20 TABLET ORAL 2 TIMES DAILY
Status: DISCONTINUED | OUTPATIENT
Start: 2019-11-12 | End: 2019-11-14

## 2019-11-12 RX ORDER — PROCHLORPERAZINE EDISYLATE 5 MG/ML
10 INJECTION INTRAMUSCULAR; INTRAVENOUS EVERY 6 HOURS PRN
Status: DISCONTINUED | OUTPATIENT
Start: 2019-11-12 | End: 2019-11-14

## 2019-11-12 RX ORDER — DIPHENHYDRAMINE HYDROCHLORIDE 50 MG/ML
12.5 INJECTION INTRAMUSCULAR; INTRAVENOUS EVERY 4 HOURS PRN
Status: ACTIVE | OUTPATIENT
Start: 2019-11-12 | End: 2019-11-13

## 2019-11-12 RX ORDER — ACETAMINOPHEN 500 MG
1000 TABLET ORAL ONCE
Status: COMPLETED | OUTPATIENT
Start: 2019-11-12 | End: 2019-11-12

## 2019-11-12 RX ORDER — ACETAMINOPHEN 500 MG
1000 TABLET ORAL ONCE
Status: DISCONTINUED | OUTPATIENT
Start: 2019-11-12 | End: 2019-11-12

## 2019-11-12 RX ORDER — METOCLOPRAMIDE 10 MG/1
10 TABLET ORAL ONCE
Status: COMPLETED | OUTPATIENT
Start: 2019-11-12 | End: 2019-11-12

## 2019-11-12 RX ORDER — HYDROMORPHONE HYDROCHLORIDE 1 MG/ML
0.6 INJECTION, SOLUTION INTRAMUSCULAR; INTRAVENOUS; SUBCUTANEOUS
Status: ACTIVE | OUTPATIENT
Start: 2019-11-12 | End: 2019-11-13

## 2019-11-12 RX ADMIN — LIDOCAINE HYDROCHLORIDE 50 MG: 10 INJECTION, SOLUTION EPIDURAL; INFILTRATION; INTRACAUDAL; PERINEURAL at 12:42:00

## 2019-11-12 RX ADMIN — BUPIVACAINE HYDROCHLORIDE 1.5 ML: 7.5 INJECTION, SOLUTION INTRASPINAL at 12:40:00

## 2019-11-12 RX ADMIN — MIDAZOLAM HYDROCHLORIDE 2 MG: 1 INJECTION INTRAMUSCULAR; INTRAVENOUS at 12:31:00

## 2019-11-12 RX ADMIN — CEFAZOLIN SODIUM/WATER 2 G: 2 G/20 ML SYRINGE (ML) INTRAVENOUS at 13:14:00

## 2019-11-12 RX ADMIN — LIDOCAINE HYDROCHLORIDE 25 MG: 10 INJECTION, SOLUTION EPIDURAL; INFILTRATION; INTRACAUDAL; PERINEURAL at 12:35:00

## 2019-11-12 RX ADMIN — SODIUM CHLORIDE, SODIUM LACTATE, POTASSIUM CHLORIDE, CALCIUM CHLORIDE: 600; 310; 30; 20 INJECTION, SOLUTION INTRAVENOUS at 15:47:00

## 2019-11-12 RX ADMIN — MORPHINE SULFATE 0.3 MG: 1 INJECTION, SOLUTION EPIDURAL; INTRATHECAL; INTRAVENOUS at 12:40:00

## 2019-11-12 RX ADMIN — DEXAMETHASONE SODIUM PHOSPHATE 4 MG: 4 MG/ML VIAL (ML) INJECTION at 12:53:00

## 2019-11-12 RX ADMIN — SODIUM CHLORIDE, SODIUM LACTATE, POTASSIUM CHLORIDE, CALCIUM CHLORIDE: 600; 310; 30; 20 INJECTION, SOLUTION INTRAVENOUS at 15:13:00

## 2019-11-12 RX ADMIN — ONDANSETRON 4 MG: 2 INJECTION INTRAMUSCULAR; INTRAVENOUS at 12:53:00

## 2019-11-12 NOTE — ANESTHESIA PREPROCEDURE EVALUATION
Anesthesia PreOp Note    HPI:     Yuli Miller is a 75year old female who presents for preoperative consultation requested by: Hermelinda Marquis MD    Date of Surgery: 11/12/2019    Procedure(s):  KNEE TOTAL REVISION  Indication: failed left t TOTAL KNEE REPLACEMENT Right 05/01/2018    DR. BRANHAM       Nitrofurantoin Monohyd Macro 100 MG Oral Cap, Take 100 mg by mouth 2 (two) times daily. , Disp: , Rfl: , 11/11/2019 at 1830  Probiotic Product (PROBIOTIC-10 OR), Take 1 capsule by mouth as needed at 11/12/19 1231  lidocaine PF (XYLOCAINE) 1% injection, , Injection, PRN, Bibi Sinclair CRNA, 50 mg at 11/12/19 1242  Bupivacaine in Dextrose (MARCAINE) 0.75-8.25 % injection, , Intrathecal, PRN, Bibi Sinclair, CRNA, 1.5 mL at 11/12/19 12 Social connections:        Talks on phone: Not on file        Gets together: Not on file        Attends Gnosticism service: Not on file        Active member of club or organization: Not on file        Attends meetings of clubs or organizations: Not on file pain medication, IV analgesics and Intrathecal narcotics  Informed Consent Plan and Risks Discussed With:  Patient and spouse  Discussed plan with:  CRNA      I have informed Vidya Jimenez and/or legal guardian or family member of the nature of the an

## 2019-11-12 NOTE — ANESTHESIA PROCEDURE NOTES
Spinal Block  Performed by: Jakob Yanez., CRNA  Authorized by: Conchita Licona MD       General Information and Staff    Start Time:  11/12/2019 12:36 PM  End Time:  11/12/2019 12:40 PM  Anesthesiologist:  Conchita Licona MD  CRNA:  Osito Hi

## 2019-11-12 NOTE — BRIEF OP NOTE
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

## 2019-11-12 NOTE — INTERVAL H&P NOTE
Pre-op Diagnosis: failed left total knee arthroplasty    The above referenced H&P was reviewed by Florida Chacon MD on 11/12/2019, the patient was examined and no significant changes have occurred in the patient's condition since the H&P was perfor

## 2019-11-12 NOTE — ANESTHESIA POSTPROCEDURE EVALUATION
Patient: Robbie Jeronimo    Procedure Summary     Date:  11/12/19 Room / Location:  [facility] OR  / [facility] OR    Anesthesia Start:  6985 Anesthesia Stop:  7129    Procedure:  KNEE TOTAL REVISION (Left Knee) Diagnosis:  (failed left total knee arthrop

## 2019-11-12 NOTE — PROGRESS NOTES
[facility] HOSP - [facility]    Progress Note    Wang Jeronimo Patient Status:  Surgery Admit Helder Lebron    1955 MRN [id number]   Location [address] UNIT Attending Lizz Christensen MD   Hosp Day # 0 AYANNA Mcintyre PROPHYLAXIS, PT/OT. MILD TACHYCARDIA,   MONITOR.          Results:     Lab Results   Component Value Date    WBC 11.1 (H) 05/04/2018    HGB 10.3 (L) 05/04/2018    HCT 31.2 (L) 05/04/2018     05/04/2018    CREATSERUM 0.63 11/08/2019    BUN 17 11/0

## 2019-11-13 PROBLEM — Z01.818 PREOP TESTING: Status: ACTIVE | Noted: 2019-02-25

## 2019-11-13 PROCEDURE — 99233 SBSQ HOSP IP/OBS HIGH 50: CPT | Performed by: HOSPITALIST

## 2019-11-13 NOTE — PROGRESS NOTES
CONNIE [address] Patient Status:  Inpatient    1955 MRN [id number]   Location [facility] 4W/SW/SE Attending Junito Kenyon MD   Hosp Day # 1 PCP Oralia Taylor     Subjective:  Post-Operative Day: 1 Status Post left

## 2019-11-13 NOTE — OPERATIVE REPORT
[facility]    PATIENT'S NAME: Chuy Silveira   ATTENDING PHYSICIAN: Victorino Starr MD   OPERATING PHYSICIAN: Alexandria Jolly MD   PATIENT ACCOUNT#:   [id number]    LOCATION:  [facility] [address] PACU [facility] 10  MEDICAL RECORD #:   [id number] component was loose and had started to subside into the proximal tibia. I needed to remove the femoral component to have adequate access to the tibia.   I was able to easily remove the femoral component by undermining all the surfaces with an osteotome and good condition. Estimated blood loss was 100 mL. There were no complications. Routine specimens were sent to Microbiology and Pathology as described above.       Dictated By Vladislav Light MD  d: 11/12/2019 15:48:13  t: 11/12/2019 16:08:58  [id number]

## 2019-11-13 NOTE — PHYSICAL THERAPY NOTE
PHYSICAL THERAPY KNEE EVALUATION - INPATIENT       Room Number: 421/421-A  Evaluation Date: 11/13/2019  Type of Evaluation: Initial  Physician Order: PT Eval and Treat    Presenting Problem: s/p L TKA revision  Reason for Therapy: Mobility Dysfunction and instructed to have spouse or son assist for stair navigation at home to ensure safety, pt agreeable. Pt returned to room, assisted to bathroom to void, supervision for toilet tx.  Pt returned to seated in bedside chair post-mobility, encouraged to perform e [facility] OR   • KNEE TOTAL REVISION Left 11/12/2019    Performed by Martha Yost MD at [facility] OR   • TOTAL KNEE REPLACEMENT     • TOTAL KNEE REPLACEMENT Right 05/01/2018    DR. BRANHAM     HOME SITUATION  Type of Home: House   Home Layout:  On currently have. ..  -   Turning over in bed (including adjusting bedclothes, sheets and blankets)?: None   -   Sitting down on and standing up from a chair with arms (e.g., wheelchair, bedside commode, etc.): A Little   -   Moving from lying on back to sitt Status Pt performs bed mobility at SBA   Goal #2 Patient is able to demonstrate transfers Sit to/from Stand at assistance level: modified independent     Goal #2  Current Status Pt performs transfers with rolling walker at supervision    Goal #3 Patient is

## 2019-11-13 NOTE — ANESTHESIA POST-OP FOLLOW-UP NOTE
S/p TKA, intrathecal Duramorph  POD # 1,doing well   Admits min itching  No HA no BA no new neurologic signs or symptoms   Site is clean dry intact   D/c from service

## 2019-11-13 NOTE — PHYSICAL THERAPY NOTE
Attempted to see pt for PT tx. Pt recently repositioned with ice in place, requesting politely for PT to return in 30 min. Will f/u at a later time for PT tx.

## 2019-11-13 NOTE — OCCUPATIONAL THERAPY NOTE
OCCUPATIONAL THERAPY EVALUATION - INPATIENT      Room Number: 421/421-A  Evaluation Date: 11/13/2019  Type of Evaluation: Initial       Physician Order: IP Consult to Occupational Therapy  Reason for Therapy: ADL/IADL Dysfunction and Discharge Planning Procedure Laterality Date   • ANESTH,SURGERY OF SHOULDER Right    •      • KNEE TOTAL REPLACEMENT Right 2018    Performed by Kiki Cotton MD at [address] [place] [address] OR   • [address] Left 2019    Performed by Lamar Olvera, CK    FUNCTIONAL TRANSFER ASSESSMENT        Toilet Transfer: SBA   Shower Transfer: NT  Chair Transfer: SBA   Car Transfer: educated on car transfers  Bedroom Mobility: SBA with RW     FUNCTIONAL ADL ASSESSMENT  Grooming: SBA   Feeding: Brenda  Bathing: SBA

## 2019-11-13 NOTE — CM/SW NOTE
Saw pt at bedside post total joint with Dr. Christine Lyman. Pt states that she lives in a one level home with her  with 3 steps to enter through the garage. She is independent with ADLS and driving prior to admission.   Has hx with [facility] post her Rt tota

## 2019-11-13 NOTE — PLAN OF CARE
PRN pain medications offered throughout shift; Patient denied need for medication. Polar ice maintained. CPM used last night for 1 hour and tolerated well. Yusuf out this AM. O2 weaned. Patient with no complaints at this time. Will continue to monitor. response  - Consider cultural and social influences on pain and pain management  - Manage/alleviate anxiety  - Utilize distraction and/or relaxation techniques  - Monitor for opioid side effects  - Notify MD/LIP if interventions unsuccessful or patient rep integrity remains intact  Description  INTERVENTIONS  - Assess and document risk factors for pressure ulcer development  - Assess and document skin integrity  - Monitor for areas of redness and/or skin breakdown  - Initiate interventions, skin care algorit

## 2019-11-14 VITALS
TEMPERATURE: 98 F | WEIGHT: 198.81 LBS | DIASTOLIC BLOOD PRESSURE: 68 MMHG | OXYGEN SATURATION: 99 % | HEART RATE: 115 BPM | SYSTOLIC BLOOD PRESSURE: 147 MMHG | HEIGHT: 63 IN | BODY MASS INDEX: 35.23 KG/M2 | RESPIRATION RATE: 19 BRPM

## 2019-11-14 PROCEDURE — 99239 HOSP IP/OBS DSCHRG MGMT >30: CPT | Performed by: HOSPITALIST

## 2019-11-14 RX ORDER — CELECOXIB 200 MG/1
200 CAPSULE ORAL EVERY 12 HOURS SCHEDULED
Qty: 60 CAPSULE | Refills: 0 | Status: SHIPPED | OUTPATIENT
Start: 2019-11-14 | End: 2020-07-07

## 2019-11-14 RX ORDER — GABAPENTIN 300 MG/1
300 CAPSULE ORAL NIGHTLY
Qty: 20 CAPSULE | Refills: 0 | Status: SHIPPED | OUTPATIENT
Start: 2019-11-14 | End: 2020-07-07

## 2019-11-14 RX ORDER — PSEUDOEPHEDRINE HCL 30 MG
100 TABLET ORAL 2 TIMES DAILY
Qty: 20 CAPSULE | Refills: 0 | Status: SHIPPED | OUTPATIENT
Start: 2019-11-14 | End: 2020-07-07

## 2019-11-14 RX ORDER — HYDROCODONE BITARTRATE AND ACETAMINOPHEN 7.5; 325 MG/1; MG/1
1 TABLET ORAL EVERY 4 HOURS PRN
Qty: 50 TABLET | Refills: 0 | Status: SHIPPED | OUTPATIENT
Start: 2019-11-14 | End: 2020-07-07

## 2019-11-14 RX ORDER — MELATONIN
325
Qty: 20 TABLET | Refills: 0 | Status: SHIPPED | OUTPATIENT
Start: 2019-11-14 | End: 2020-07-07

## 2019-11-14 NOTE — PHYSICAL THERAPY NOTE
PHYSICAL THERAPY KNEE TREATMENT NOTE - INPATIENT     Room Number: 421/421-A             Presenting Problem: s/p L TKA revision    Problem List  Principal Problem:    Failed total knee arthroplasty ([address]  Active Problems:    Preop testing      PHYSICAL THER -   Moving from lying on back to sitting on the side of the bed?: None   How much help from another person does the patient currently need. ..   -   Moving to and from a bed to a chair (including a wheelchair)?: A Little   -   Need to walk in \[facility] [place] [facility]\"" device at assistance level: modified independent    Goal #3   Current Status Pt ambulates 300 ft with rolling walker at SBA   Goal #4 Patient will negotiate 3 stairs/one curb w/ assistive device and supervision   Goal #4   Current Status Pt negotiates 3 st

## 2019-11-14 NOTE — PROGRESS NOTES
[facility]    Progress Note    Dee Jeronimo Patient Status:  Inpatient    1955 MRN [id number]   [facility] 4W/SW/SE Attending Cherelle Mosquera MD   Hosp Day # 2 PCP Oralia Taylor       Subjective:   Shiva Hood MD on 11/12/2019 at 16:50                  [facility], ZOHAIB  11/14/2019

## 2019-11-14 NOTE — CM/SW NOTE
Spoke with Estela from [facility]. They are able to accept pt. Estela made aware pt is ready for dc today. Live 78 orders and F2F entered. / to remain available for support and/or discharge planning. Isaura Orona.  Swati Downing RN, BSN  Nurse Ca

## 2019-11-14 NOTE — HOME CARE LIAISON
Received referral from [facility]UAN[facility]. Met with patient at the bedside. Patient is agreeable to [facility], pending orders. Residential brochure provided with contact information. All questions addressed and answered.      Patient will need a

## 2019-11-14 NOTE — DISCHARGE SUMMARY
[facility]    Discharge Summary    Rosy Jeronimo Patient Status:  Inpatient    1955 MRN [id number]   Location [facility] 4W/SW/SE Attending Kimmie Singh MD   Hosp Day # 2 PCP Oralia Taylor     Date of Admission: 1 Procedures     Case IDs Date Procedure Surgeon Location Status    [id number] 11/12/19 KNEE TOTAL REVISION Sybil Ng MD [facility] OR Comp        Pending Labs     Order Current Status    SURGICAL PATHOLOGY TISSUE In process    ANAEROBIC CULTURE Prel by Nasal route as needed. For narcotic overdose. lansoprazole 30 MG Oral Capsule Delayed Release  Take 30 mg by mouth as needed.               Discharge Diet: general diet     Discharge Activity: As tolerated       Discharge Medications      START taking Quantity:  60 tablet  Refills:  1     famotidine 10 MG Tabs  Commonly known as:  PEPCID      Take 10 mg by mouth 2 (two) times daily as needed for Heartburn.    Refills:  0     Fexofenadine HCl 180 MG Tabs  Commonly known as:  ALLEGRA      Take 180 mg by mo dressing every 3-5 days.   Ambulate with walker and assist.        Time spent:  > 30 minutes    Magalie Jacinto  11/14/2019

## 2019-11-14 NOTE — PROGRESS NOTES
[facility]    Progress Note    Darnelljeffrey Cortezs Patient Status:  Inpatient    1955 MRN [id number]   [facility] 4W/SW/SE Attending Chaka Posey MD   Hosp Day # 1 PCP Oralia Taylor       Subjective:   Mireya Cota famoTIDine  20 mg Oral BID    Or   • famoTIDine  20 mg Intravenous BID   • ferrous sulfate  325 mg Oral Daily with breakfast   • gabapentin  300 mg Oral Nightly     Normal Saline Flush, sodium chloride 0.9%, PEG 3350, magnesium hydroxide, bisacodyl, FLEET

## 2019-11-14 NOTE — PLAN OF CARE
Giving scripts. No walker needed.   Cleared per PT/Ortho/MD    Problem: Patient Centered Care  Goal: Patient preferences are identified and integrated in the patient's plan of care  Description  Interventions:  - What would you like us to know as we care f for opioid side effects  - Notify MD/LIP if interventions unsuccessful or patient reports new pain  - Anticipate increased pain with activity and pre-medicate as appropriate  Outcome: Adequate for Discharge     Problem: SAFETY ADULT - FALL  Goal: Free from document skin integrity  - Monitor for areas of redness and/or skin breakdown  - Initiate interventions, skin care algorithm/standards of care as needed  Outcome: Adequate for Discharge     Problem: Impaired Functional Mobility  Goal: Achieve highest/safes

## 2019-11-14 NOTE — PLAN OF CARE
Say Wilkinson is currently taking norco prn for pain management with good results. Polar care also in use. Patient used CPM machine for over an hour this evening, -2-60 degrees. Eliquis and scd's for dvt prophylaxis.  Bed is locked and in the lowest position with PLANNING  Goal: Discharge to home or other facility with appropriate resources  Description  INTERVENTIONS:  - Identify barriers to discharge w/pt and caregiver  - Include patient/family/discharge partner in discharge planning  - Arrange for needed dischar

## 2020-05-27 RX ORDER — AMOXICILLIN 500 MG/1
TABLET, FILM COATED ORAL
Qty: 4 TABLET | Refills: 0 | Status: SHIPPED | OUTPATIENT
Start: 2020-05-27

## 2020-07-07 ENCOUNTER — LAB ENCOUNTER (OUTPATIENT)
Dept: LAB | Facility: HOSPITAL | Age: 65
DRG: 467 | End: 2020-07-07
Attending: ORTHOPAEDIC SURGERY
Payer: MEDICARE

## 2020-07-07 DIAGNOSIS — Z01.818 PRE-OP TESTING: ICD-10-CM

## 2020-07-07 LAB
ANTIBODY SCREEN: NEGATIVE
RH BLOOD TYPE: POSITIVE

## 2020-07-07 PROCEDURE — 86901 BLOOD TYPING SEROLOGIC RH(D): CPT

## 2020-07-07 PROCEDURE — 86900 BLOOD TYPING SEROLOGIC ABO: CPT

## 2020-07-07 PROCEDURE — 86850 RBC ANTIBODY SCREEN: CPT

## 2020-07-07 PROCEDURE — 36415 COLL VENOUS BLD VENIPUNCTURE: CPT

## 2020-07-07 PROCEDURE — 87641 MR-STAPH DNA AMP PROBE: CPT

## 2020-07-07 RX ORDER — TURMERIC ROOT EXTRACT 500 MG
500 TABLET ORAL DAILY
COMMUNITY

## 2020-07-08 LAB — MRSA DNA SPEC QL NAA+PROBE: NEGATIVE

## 2020-07-08 NOTE — H&P
[place] Angel Jeronimo Patient Status:  Surgery Admit Helder Lebron    1955 MRN [id number]   [place] [address] Attending Pascale Mccoy, *   Hosp Day # 0 PCP Oralia Khanix and cooperative  Extremities: On physical examination there is an effusion in her right knee. She has full extension and 115 degrees of flexion with neutral alignment.  The knee is stable in extension and have increased laxity with a varus stress in mid fle

## 2020-07-08 NOTE — PAT NURSING NOTE
Confirmed with Miriam Solorzano from  [facility]' office that pt does not need to return to the lab for  PTT as verified with surgeon .

## 2020-07-09 ENCOUNTER — LAB ENCOUNTER (OUTPATIENT)
Dept: LAB | Facility: HOSPITAL | Age: 65
End: 2020-07-09
Attending: ORTHOPAEDIC SURGERY
Payer: MEDICARE

## 2020-07-09 DIAGNOSIS — Z01.818 PRE-OP TESTING: ICD-10-CM

## 2020-07-09 LAB — SARS-COV-2 RNA RESP QL NAA+PROBE: NOT DETECTED

## 2020-07-10 ENCOUNTER — HOSPITAL ENCOUNTER (INPATIENT)
Facility: HOSPITAL | Age: 65
LOS: 2 days | Discharge: HOME HEALTH | DRG: 467 | End: 2020-07-12
Attending: ORTHOPAEDIC SURGERY | Admitting: ORTHOPAEDIC SURGERY
Payer: MEDICARE

## 2020-07-10 ENCOUNTER — APPOINTMENT (OUTPATIENT)
Dept: CV DIAGNOSTICS | Facility: HOSPITAL | Age: 65
DRG: 467 | End: 2020-07-10
Attending: INTERNAL MEDICINE
Payer: MEDICARE

## 2020-07-10 ENCOUNTER — ANESTHESIA EVENT (OUTPATIENT)
Dept: SURGERY | Facility: HOSPITAL | Age: 65
DRG: 467 | End: 2020-07-10
Payer: MEDICARE

## 2020-07-10 ENCOUNTER — APPOINTMENT (OUTPATIENT)
Dept: GENERAL RADIOLOGY | Facility: HOSPITAL | Age: 65
DRG: 467 | End: 2020-07-10
Attending: PHYSICIAN ASSISTANT
Payer: MEDICARE

## 2020-07-10 ENCOUNTER — ANESTHESIA (OUTPATIENT)
Dept: SURGERY | Facility: HOSPITAL | Age: 65
DRG: 467 | End: 2020-07-10
Payer: MEDICARE

## 2020-07-10 DIAGNOSIS — Z01.818 PRE-OP TESTING: Primary | ICD-10-CM

## 2020-07-10 PROBLEM — T84.012S FAILED TOTAL KNEE, RIGHT, SEQUELA: Status: ACTIVE | Noted: 2020-07-10

## 2020-07-10 LAB
ALBUMIN SERPL-MCNC: 3.3 G/DL (ref 3.4–5)
ALBUMIN/GLOB SERPL: 1 {RATIO} (ref 1–2)
ALP LIVER SERPL-CCNC: 122 U/L (ref 55–142)
ALT SERPL-CCNC: 20 U/L (ref 13–56)
ANION GAP SERPL CALC-SCNC: 3 MMOL/L (ref 0–18)
AST SERPL-CCNC: 15 U/L (ref 15–37)
BASOPHILS # BLD AUTO: 0.07 X10(3) UL (ref 0–0.2)
BASOPHILS NFR BLD AUTO: 0.4 %
BASOPHILS NFR FLD: 0 %
BILIRUB SERPL-MCNC: 0.2 MG/DL (ref 0.1–2)
BUN BLD-MCNC: 15 MG/DL (ref 7–18)
BUN/CREAT SERPL: 25 (ref 10–20)
CALCIUM BLD-MCNC: 8.4 MG/DL (ref 8.5–10.1)
CHLORIDE SERPL-SCNC: 111 MMOL/L (ref 98–112)
CHOLEST SMN-MCNC: 178 MG/DL (ref ?–200)
CK SERPL-CCNC: 125 U/L (ref 26–192)
CO2 SERPL-SCNC: 29 MMOL/L (ref 21–32)
CREAT BLD-MCNC: 0.6 MG/DL (ref 0.55–1.02)
DEPRECATED RDW RBC AUTO: 44.9 FL (ref 35.1–46.3)
EOSINOPHIL # BLD AUTO: 0.03 X10(3) UL (ref 0–0.7)
EOSINOPHIL NFR BLD AUTO: 0.2 %
EOSINOPHIL NFR FLD: 0 %
ERYTHROCYTE [DISTWIDTH] IN BLOOD BY AUTOMATED COUNT: 13 % (ref 11–15)
GLOBULIN PLAS-MCNC: 3.2 G/DL (ref 2.8–4.4)
GLUCOSE BLD-MCNC: 135 MG/DL (ref 70–99)
HAV IGM SER QL: 2.4 MG/DL (ref 1.6–2.6)
HCT VFR BLD AUTO: 40.1 % (ref 35–48)
HDLC SERPL-MCNC: 76 MG/DL (ref 40–59)
HGB BLD-MCNC: 13.1 G/DL (ref 12–16)
IMM GRANULOCYTES # BLD AUTO: 0.09 X10(3) UL (ref 0–1)
IMM GRANULOCYTES NFR BLD: 0.5 %
LDLC SERPL CALC-MCNC: 87 MG/DL (ref ?–100)
LYMPHOCYTES # BLD AUTO: 5.42 X10(3) UL (ref 1–4)
LYMPHOCYTES NFR BLD AUTO: 33 %
LYMPHOCYTES NFR FLD: 43 %
M PROTEIN MFR SERPL ELPH: 6.5 G/DL (ref 6.4–8.2)
MCH RBC QN AUTO: 30.8 PG (ref 26–34)
MCHC RBC AUTO-ENTMCNC: 32.7 G/DL (ref 31–37)
MCV RBC AUTO: 94.4 FL (ref 80–100)
MONOCYTES # BLD AUTO: 0.79 X10(3) UL (ref 0.1–1)
MONOCYTES NFR BLD AUTO: 4.8 %
MONOCYTES NFR FLD: 14 %
NEUTROPHILS # BLD AUTO: 10.01 X10 (3) UL (ref 1.5–7.7)
NEUTROPHILS # BLD AUTO: 10.01 X10(3) UL (ref 1.5–7.7)
NEUTROPHILS NFR BLD AUTO: 61.1 %
NEUTROPHILS NFR FLD: 43 %
NONHDLC SERPL-MCNC: 102 MG/DL (ref ?–130)
OSMOLALITY SERPL CALC.SUM OF ELEC: 299 MOSM/KG (ref 275–295)
PLATELET # BLD AUTO: 430 10(3)UL (ref 150–450)
POTASSIUM SERPL-SCNC: 3.7 MMOL/L (ref 3.5–5.1)
RBC # BLD AUTO: 4.25 X10(6)UL (ref 3.8–5.3)
RBC # FLD: ABNORMAL /CUMM (ref ?–1)
SODIUM SERPL-SCNC: 143 MMOL/L (ref 136–145)
T4 FREE SERPL-MCNC: 1.1 NG/DL (ref 0.8–1.7)
TRIGL SERPL-MCNC: 75 MG/DL (ref 30–149)
TROPONIN I SERPL-MCNC: <0.045 NG/ML (ref ?–0.04)
TSI SER-ACNC: 2.5 MIU/ML (ref 0.36–3.74)
VLDLC SERPL CALC-MCNC: 15 MG/DL (ref 0–30)
WBC # BLD AUTO: 16.4 X10(3) UL (ref 4–11)
WBC # FLD: 606 /CUMM

## 2020-07-10 PROCEDURE — 0SRC0J9 REPLACEMENT OF RIGHT KNEE JOINT WITH SYNTHETIC SUBSTITUTE, CEMENTED, OPEN APPROACH: ICD-10-PCS | Performed by: ORTHOPAEDIC SURGERY

## 2020-07-10 PROCEDURE — 99232 SBSQ HOSP IP/OBS MODERATE 35: CPT | Performed by: HOSPITALIST

## 2020-07-10 PROCEDURE — 0SPC0JZ REMOVAL OF SYNTHETIC SUBSTITUTE FROM RIGHT KNEE JOINT, OPEN APPROACH: ICD-10-PCS | Performed by: ORTHOPAEDIC SURGERY

## 2020-07-10 PROCEDURE — 73560 X-RAY EXAM OF KNEE 1 OR 2: CPT | Performed by: PHYSICIAN ASSISTANT

## 2020-07-10 PROCEDURE — 93306 TTE W/DOPPLER COMPLETE: CPT | Performed by: INTERNAL MEDICINE

## 2020-07-10 DEVICE — KIT FEM BONE CEMENT RESTRICTOR: Type: IMPLANTABLE DEVICE | Site: KNEE | Status: FUNCTIONAL

## 2020-07-10 DEVICE — REFOBACIN BC R 1X40 US: Type: IMPLANTABLE DEVICE | Site: KNEE | Status: FUNCTIONAL

## 2020-07-10 RX ORDER — METOCLOPRAMIDE HYDROCHLORIDE 5 MG/ML
10 INJECTION INTRAMUSCULAR; INTRAVENOUS EVERY 6 HOURS PRN
Status: DISPENSED | OUTPATIENT
Start: 2020-07-10 | End: 2020-07-12

## 2020-07-10 RX ORDER — HYDROMORPHONE HYDROCHLORIDE 1 MG/ML
0.4 INJECTION, SOLUTION INTRAMUSCULAR; INTRAVENOUS; SUBCUTANEOUS EVERY 5 MIN PRN
Status: DISCONTINUED | OUTPATIENT
Start: 2020-07-10 | End: 2020-07-10 | Stop reason: HOSPADM

## 2020-07-10 RX ORDER — HYDROMORPHONE HYDROCHLORIDE 1 MG/ML
0.2 INJECTION, SOLUTION INTRAMUSCULAR; INTRAVENOUS; SUBCUTANEOUS EVERY 5 MIN PRN
Status: DISCONTINUED | OUTPATIENT
Start: 2020-07-10 | End: 2020-07-10 | Stop reason: HOSPADM

## 2020-07-10 RX ORDER — HYDROCODONE BITARTRATE AND ACETAMINOPHEN 5; 325 MG/1; MG/1
1 TABLET ORAL EVERY 4 HOURS PRN
Status: DISCONTINUED | OUTPATIENT
Start: 2020-07-10 | End: 2020-07-12

## 2020-07-10 RX ORDER — HYDROCODONE BITARTRATE AND ACETAMINOPHEN 5; 325 MG/1; MG/1
2 TABLET ORAL AS NEEDED
Status: DISCONTINUED | OUTPATIENT
Start: 2020-07-10 | End: 2020-07-10 | Stop reason: HOSPADM

## 2020-07-10 RX ORDER — TRANEXAMIC ACID 10 MG/ML
1000 INJECTION, SOLUTION INTRAVENOUS ONCE
Status: COMPLETED | OUTPATIENT
Start: 2020-07-10 | End: 2020-07-10

## 2020-07-10 RX ORDER — PROCHLORPERAZINE EDISYLATE 5 MG/ML
5 INJECTION INTRAMUSCULAR; INTRAVENOUS ONCE AS NEEDED
Status: ACTIVE | OUTPATIENT
Start: 2020-07-10 | End: 2020-07-10

## 2020-07-10 RX ORDER — POLYETHYLENE GLYCOL 3350 17 G/17G
17 POWDER, FOR SOLUTION ORAL DAILY PRN
Status: DISCONTINUED | OUTPATIENT
Start: 2020-07-10 | End: 2020-07-12

## 2020-07-10 RX ORDER — ONDANSETRON 2 MG/ML
4 INJECTION INTRAMUSCULAR; INTRAVENOUS ONCE AS NEEDED
Status: ACTIVE | OUTPATIENT
Start: 2020-07-10 | End: 2020-07-10

## 2020-07-10 RX ORDER — GABAPENTIN 600 MG/1
600 TABLET ORAL ONCE
Status: COMPLETED | OUTPATIENT
Start: 2020-07-10 | End: 2020-07-10

## 2020-07-10 RX ORDER — DIPHENHYDRAMINE HYDROCHLORIDE 50 MG/ML
12.5 INJECTION INTRAMUSCULAR; INTRAVENOUS EVERY 4 HOURS PRN
Status: DISCONTINUED | OUTPATIENT
Start: 2020-07-10 | End: 2020-07-12

## 2020-07-10 RX ORDER — ACETAMINOPHEN 325 MG/1
650 TABLET ORAL EVERY 4 HOURS PRN
Status: DISCONTINUED | OUTPATIENT
Start: 2020-07-10 | End: 2020-07-12

## 2020-07-10 RX ORDER — FAMOTIDINE 20 MG/1
20 TABLET ORAL ONCE
Status: DISCONTINUED | OUTPATIENT
Start: 2020-07-10 | End: 2020-07-10 | Stop reason: HOSPADM

## 2020-07-10 RX ORDER — VANCOMYCIN HYDROCHLORIDE
15 ONCE
Status: COMPLETED | OUTPATIENT
Start: 2020-07-10 | End: 2020-07-10

## 2020-07-10 RX ORDER — PHENYLEPHRINE HCL 10 MG/ML
VIAL (ML) INJECTION AS NEEDED
Status: DISCONTINUED | OUTPATIENT
Start: 2020-07-10 | End: 2020-07-10 | Stop reason: SURG

## 2020-07-10 RX ORDER — MELATONIN
325
Status: DISCONTINUED | OUTPATIENT
Start: 2020-07-11 | End: 2020-07-12

## 2020-07-10 RX ORDER — DIPHENHYDRAMINE HCL 25 MG
25 CAPSULE ORAL EVERY 4 HOURS PRN
Status: DISPENSED | OUTPATIENT
Start: 2020-07-10 | End: 2020-07-11

## 2020-07-10 RX ORDER — SENNOSIDES 8.6 MG
17.2 TABLET ORAL NIGHTLY
Status: DISCONTINUED | OUTPATIENT
Start: 2020-07-10 | End: 2020-07-12

## 2020-07-10 RX ORDER — METOCLOPRAMIDE 10 MG/1
10 TABLET ORAL ONCE
Status: COMPLETED | OUTPATIENT
Start: 2020-07-10 | End: 2020-07-10

## 2020-07-10 RX ORDER — PANTOPRAZOLE SODIUM 40 MG/1
40 TABLET, DELAYED RELEASE ORAL
Status: DISCONTINUED | OUTPATIENT
Start: 2020-07-11 | End: 2020-07-12

## 2020-07-10 RX ORDER — CELECOXIB 200 MG/1
400 CAPSULE ORAL ONCE
Status: COMPLETED | OUTPATIENT
Start: 2020-07-10 | End: 2020-07-10

## 2020-07-10 RX ORDER — CEFAZOLIN SODIUM/WATER 2 G/20 ML
2 SYRINGE (ML) INTRAVENOUS ONCE
Status: COMPLETED | OUTPATIENT
Start: 2020-07-10 | End: 2020-07-10

## 2020-07-10 RX ORDER — NALOXONE HYDROCHLORIDE 0.4 MG/ML
0.08 INJECTION, SOLUTION INTRAMUSCULAR; INTRAVENOUS; SUBCUTANEOUS
Status: ACTIVE | OUTPATIENT
Start: 2020-07-10 | End: 2020-07-11

## 2020-07-10 RX ORDER — NALOXONE HYDROCHLORIDE 0.4 MG/ML
80 INJECTION, SOLUTION INTRAMUSCULAR; INTRAVENOUS; SUBCUTANEOUS AS NEEDED
Status: DISCONTINUED | OUTPATIENT
Start: 2020-07-10 | End: 2020-07-10 | Stop reason: HOSPADM

## 2020-07-10 RX ORDER — FAMOTIDINE 20 MG/1
20 TABLET ORAL 2 TIMES DAILY
Status: DISCONTINUED | OUTPATIENT
Start: 2020-07-10 | End: 2020-07-12

## 2020-07-10 RX ORDER — ONDANSETRON 2 MG/ML
4 INJECTION INTRAMUSCULAR; INTRAVENOUS EVERY 4 HOURS PRN
Status: DISPENSED | OUTPATIENT
Start: 2020-07-10 | End: 2020-07-12

## 2020-07-10 RX ORDER — ACETAMINOPHEN 500 MG
1000 TABLET ORAL ONCE
Status: DISCONTINUED | OUTPATIENT
Start: 2020-07-10 | End: 2020-07-10 | Stop reason: HOSPADM

## 2020-07-10 RX ORDER — MORPHINE SULFATE 4 MG/ML
2 INJECTION, SOLUTION INTRAMUSCULAR; INTRAVENOUS EVERY 10 MIN PRN
Status: DISCONTINUED | OUTPATIENT
Start: 2020-07-10 | End: 2020-07-10 | Stop reason: HOSPADM

## 2020-07-10 RX ORDER — DIPHENHYDRAMINE HCL 25 MG
25 CAPSULE ORAL EVERY 4 HOURS PRN
Status: DISCONTINUED | OUTPATIENT
Start: 2020-07-10 | End: 2020-07-12

## 2020-07-10 RX ORDER — ONDANSETRON 2 MG/ML
4 INJECTION INTRAMUSCULAR; INTRAVENOUS ONCE AS NEEDED
Status: COMPLETED | OUTPATIENT
Start: 2020-07-10 | End: 2020-07-10

## 2020-07-10 RX ORDER — PROCHLORPERAZINE EDISYLATE 5 MG/ML
10 INJECTION INTRAMUSCULAR; INTRAVENOUS EVERY 6 HOURS PRN
Status: ACTIVE | OUTPATIENT
Start: 2020-07-10 | End: 2020-07-12

## 2020-07-10 RX ORDER — DOCUSATE SODIUM 100 MG/1
100 CAPSULE, LIQUID FILLED ORAL 2 TIMES DAILY
Status: DISCONTINUED | OUTPATIENT
Start: 2020-07-10 | End: 2020-07-12

## 2020-07-10 RX ORDER — HYDROMORPHONE HYDROCHLORIDE 1 MG/ML
0.6 INJECTION, SOLUTION INTRAMUSCULAR; INTRAVENOUS; SUBCUTANEOUS EVERY 5 MIN PRN
Status: DISCONTINUED | OUTPATIENT
Start: 2020-07-10 | End: 2020-07-10 | Stop reason: HOSPADM

## 2020-07-10 RX ORDER — ACETAMINOPHEN 500 MG
1000 TABLET ORAL ONCE
Status: COMPLETED | OUTPATIENT
Start: 2020-07-10 | End: 2020-07-10

## 2020-07-10 RX ORDER — HYDROCODONE BITARTRATE AND ACETAMINOPHEN 5; 325 MG/1; MG/1
2 TABLET ORAL EVERY 4 HOURS PRN
Status: DISCONTINUED | OUTPATIENT
Start: 2020-07-10 | End: 2020-07-12

## 2020-07-10 RX ORDER — HYDROCODONE BITARTRATE AND ACETAMINOPHEN 7.5; 325 MG/1; MG/1
2 TABLET ORAL EVERY 6 HOURS PRN
Status: ACTIVE | OUTPATIENT
Start: 2020-07-10 | End: 2020-07-11

## 2020-07-10 RX ORDER — CELECOXIB 200 MG/1
200 CAPSULE ORAL EVERY 12 HOURS SCHEDULED
Status: DISCONTINUED | OUTPATIENT
Start: 2020-07-10 | End: 2020-07-12

## 2020-07-10 RX ORDER — SODIUM CHLORIDE, SODIUM LACTATE, POTASSIUM CHLORIDE, CALCIUM CHLORIDE 600; 310; 30; 20 MG/100ML; MG/100ML; MG/100ML; MG/100ML
INJECTION, SOLUTION INTRAVENOUS CONTINUOUS
Status: DISCONTINUED | OUTPATIENT
Start: 2020-07-10 | End: 2020-07-10 | Stop reason: HOSPADM

## 2020-07-10 RX ORDER — FAMOTIDINE 10 MG/ML
20 INJECTION, SOLUTION INTRAVENOUS 2 TIMES DAILY
Status: DISCONTINUED | OUTPATIENT
Start: 2020-07-10 | End: 2020-07-12

## 2020-07-10 RX ORDER — BISACODYL 10 MG
10 SUPPOSITORY, RECTAL RECTAL
Status: DISCONTINUED | OUTPATIENT
Start: 2020-07-10 | End: 2020-07-12

## 2020-07-10 RX ORDER — DIPHENHYDRAMINE HYDROCHLORIDE 50 MG/ML
25 INJECTION INTRAMUSCULAR; INTRAVENOUS ONCE AS NEEDED
Status: ACTIVE | OUTPATIENT
Start: 2020-07-10 | End: 2020-07-10

## 2020-07-10 RX ORDER — METOPROLOL TARTRATE 5 MG/5ML
INJECTION INTRAVENOUS AS NEEDED
Status: DISCONTINUED | OUTPATIENT
Start: 2020-07-10 | End: 2020-07-10

## 2020-07-10 RX ORDER — HYDROCODONE BITARTRATE AND ACETAMINOPHEN 5; 325 MG/1; MG/1
1 TABLET ORAL AS NEEDED
Status: DISCONTINUED | OUTPATIENT
Start: 2020-07-10 | End: 2020-07-10 | Stop reason: HOSPADM

## 2020-07-10 RX ORDER — MORPHINE SULFATE 10 MG/ML
6 INJECTION, SOLUTION INTRAMUSCULAR; INTRAVENOUS EVERY 10 MIN PRN
Status: DISCONTINUED | OUTPATIENT
Start: 2020-07-10 | End: 2020-07-10 | Stop reason: HOSPADM

## 2020-07-10 RX ORDER — DIPHENHYDRAMINE HYDROCHLORIDE 50 MG/ML
12.5 INJECTION INTRAMUSCULAR; INTRAVENOUS EVERY 4 HOURS PRN
Status: ACTIVE | OUTPATIENT
Start: 2020-07-10 | End: 2020-07-11

## 2020-07-10 RX ORDER — MIDAZOLAM HYDROCHLORIDE 1 MG/ML
INJECTION INTRAMUSCULAR; INTRAVENOUS AS NEEDED
Status: DISCONTINUED | OUTPATIENT
Start: 2020-07-10 | End: 2020-07-10 | Stop reason: SURG

## 2020-07-10 RX ORDER — BUPIVACAINE HYDROCHLORIDE 7.5 MG/ML
INJECTION, SOLUTION INTRASPINAL
Status: COMPLETED | OUTPATIENT
Start: 2020-07-10 | End: 2020-07-10

## 2020-07-10 RX ORDER — MORPHINE SULFATE 2 MG/ML
1 INJECTION, SOLUTION INTRAMUSCULAR; INTRAVENOUS EVERY 2 HOUR PRN
Status: DISCONTINUED | OUTPATIENT
Start: 2020-07-10 | End: 2020-07-12

## 2020-07-10 RX ORDER — MORPHINE SULFATE 4 MG/ML
4 INJECTION, SOLUTION INTRAMUSCULAR; INTRAVENOUS EVERY 2 HOUR PRN
Status: DISCONTINUED | OUTPATIENT
Start: 2020-07-10 | End: 2020-07-12

## 2020-07-10 RX ORDER — POTASSIUM CHLORIDE 14.9 MG/ML
20 INJECTION INTRAVENOUS ONCE
Status: COMPLETED | OUTPATIENT
Start: 2020-07-10 | End: 2020-07-10

## 2020-07-10 RX ORDER — SODIUM PHOSPHATE, DIBASIC AND SODIUM PHOSPHATE, MONOBASIC 7; 19 G/133ML; G/133ML
1 ENEMA RECTAL ONCE AS NEEDED
Status: DISCONTINUED | OUTPATIENT
Start: 2020-07-10 | End: 2020-07-12

## 2020-07-10 RX ORDER — LIDOCAINE HYDROCHLORIDE 10 MG/ML
INJECTION, SOLUTION INFILTRATION; PERINEURAL
Status: COMPLETED | OUTPATIENT
Start: 2020-07-10 | End: 2020-07-10

## 2020-07-10 RX ORDER — MORPHINE SULFATE 2 MG/ML
2 INJECTION, SOLUTION INTRAMUSCULAR; INTRAVENOUS EVERY 2 HOUR PRN
Status: DISCONTINUED | OUTPATIENT
Start: 2020-07-10 | End: 2020-07-12

## 2020-07-10 RX ORDER — HALOPERIDOL 5 MG/ML
0.25 INJECTION INTRAMUSCULAR ONCE AS NEEDED
Status: DISCONTINUED | OUTPATIENT
Start: 2020-07-10 | End: 2020-07-10 | Stop reason: HOSPADM

## 2020-07-10 RX ORDER — SODIUM CHLORIDE, SODIUM LACTATE, POTASSIUM CHLORIDE, CALCIUM CHLORIDE 600; 310; 30; 20 MG/100ML; MG/100ML; MG/100ML; MG/100ML
INJECTION, SOLUTION INTRAVENOUS CONTINUOUS
Status: DISCONTINUED | OUTPATIENT
Start: 2020-07-10 | End: 2020-07-12

## 2020-07-10 RX ORDER — MORPHINE SULFATE 1 MG/ML
INJECTION, SOLUTION EPIDURAL; INTRATHECAL; INTRAVENOUS
Status: COMPLETED | OUTPATIENT
Start: 2020-07-10 | End: 2020-07-10

## 2020-07-10 RX ORDER — PROCHLORPERAZINE EDISYLATE 5 MG/ML
5 INJECTION INTRAMUSCULAR; INTRAVENOUS ONCE AS NEEDED
Status: DISCONTINUED | OUTPATIENT
Start: 2020-07-10 | End: 2020-07-10 | Stop reason: HOSPADM

## 2020-07-10 RX ORDER — HYDROCODONE BITARTRATE AND ACETAMINOPHEN 7.5; 325 MG/1; MG/1
1 TABLET ORAL EVERY 6 HOURS PRN
Status: ACTIVE | OUTPATIENT
Start: 2020-07-10 | End: 2020-07-11

## 2020-07-10 RX ORDER — HYDROMORPHONE HYDROCHLORIDE 1 MG/ML
0.4 INJECTION, SOLUTION INTRAMUSCULAR; INTRAVENOUS; SUBCUTANEOUS
Status: ACTIVE | OUTPATIENT
Start: 2020-07-10 | End: 2020-07-11

## 2020-07-10 RX ORDER — MORPHINE SULFATE 4 MG/ML
4 INJECTION, SOLUTION INTRAMUSCULAR; INTRAVENOUS EVERY 10 MIN PRN
Status: DISCONTINUED | OUTPATIENT
Start: 2020-07-10 | End: 2020-07-10 | Stop reason: HOSPADM

## 2020-07-10 RX ORDER — HYDROMORPHONE HYDROCHLORIDE 1 MG/ML
0.6 INJECTION, SOLUTION INTRAMUSCULAR; INTRAVENOUS; SUBCUTANEOUS
Status: ACTIVE | OUTPATIENT
Start: 2020-07-10 | End: 2020-07-11

## 2020-07-10 RX ORDER — ACETAMINOPHEN 325 MG/1
650 TABLET ORAL EVERY 6 HOURS PRN
Status: ACTIVE | OUTPATIENT
Start: 2020-07-10 | End: 2020-07-11

## 2020-07-10 RX ORDER — HALOPERIDOL 5 MG/ML
0.5 INJECTION INTRAMUSCULAR ONCE AS NEEDED
Status: ACTIVE | OUTPATIENT
Start: 2020-07-10 | End: 2020-07-10

## 2020-07-10 RX ADMIN — MIDAZOLAM HYDROCHLORIDE 2 MG: 1 INJECTION INTRAMUSCULAR; INTRAVENOUS at 08:24:00

## 2020-07-10 RX ADMIN — SODIUM CHLORIDE, SODIUM LACTATE, POTASSIUM CHLORIDE, CALCIUM CHLORIDE: 600; 310; 30; 20 INJECTION, SOLUTION INTRAVENOUS at 07:58:00

## 2020-07-10 RX ADMIN — CEFAZOLIN SODIUM/WATER 2 G: 2 G/20 ML SYRINGE (ML) INTRAVENOUS at 08:49:00

## 2020-07-10 RX ADMIN — PHENYLEPHRINE HCL 100 MCG: 10 MG/ML VIAL (ML) INJECTION at 09:24:00

## 2020-07-10 RX ADMIN — BUPIVACAINE HYDROCHLORIDE 1.5 ML: 7.5 INJECTION, SOLUTION INTRASPINAL at 08:36:00

## 2020-07-10 RX ADMIN — TRANEXAMIC ACID 1000 MG: 10 INJECTION, SOLUTION INTRAVENOUS at 08:49:00

## 2020-07-10 RX ADMIN — LIDOCAINE HYDROCHLORIDE 5 ML: 10 INJECTION, SOLUTION INFILTRATION; PERINEURAL at 10:35:00

## 2020-07-10 RX ADMIN — MORPHINE SULFATE 0.3 MG: 1 INJECTION, SOLUTION EPIDURAL; INTRATHECAL; INTRAVENOUS at 08:36:00

## 2020-07-10 RX ADMIN — LIDOCAINE HYDROCHLORIDE 5 ML: 10 INJECTION, SOLUTION INFILTRATION; PERINEURAL at 08:36:00

## 2020-07-10 NOTE — ANESTHESIA PROCEDURE NOTES
Peripheral IV  Date/Time: 7/10/2020 10:40 AM  Inserted by: Jazz Henson MD    Placement  Needle size: 18 G  Laterality: left  Location: wrist  Local anesthetic: none  Site prep: alcohol  Technique: anatomical landmarks  Attempts: 1

## 2020-07-10 NOTE — INTERVAL H&P NOTE
Pre-op Diagnosis: failed right total knee arthroplasty    The above referenced H&P was reviewed by Carlyn Murdock MD on 7/10/2020, the patient was examined and no significant changes have occurred in the patient's condition since the H&P was perfor

## 2020-07-10 NOTE — CM/SW NOTE
Received MDO for post surgical. Pt is post total knee. SW met w/ pt in her room. Pt verified her address and confirmed living w/ her  and son. They live in a home w/ 2 levels. Pt reports she stays on the first floor and has 3 steps to enter.  Pt r

## 2020-07-10 NOTE — ANESTHESIA PREPROCEDURE EVALUATION
Anesthesia PreOp Note    HPI:     Blaine Lan is a 77year old female who presents for preoperative consultation requested by: Lorenzo Godinez MD    Date of Surgery: 7/10/2020    Procedure(s):  KNEE TOTAL REVISION  Indication: failed right t Danielle Faria MD at [address] [place] [address] MAIN OR   • KNEE TOTAL REVISION Left 11/12/2019    Performed by Donte Cervantes MD at [address] [place] [address] OR   • TOTAL KNEE REPLACEMENT     • TOTAL KNEE REPLACEMENT Right 05/01/2018    DR. CARROL Matute 500 MG Oral Tab, T Intravenous, Once, Martha Yost MD  Vancomycin HCl (VANCOCIN) 1,000 mg in sodium chloride 0.9% 250 mL IVPB add-vantage, 1,000 mg, Intravenous, Once, Martha Yost MD    No current [facility]-ordered outpatient medications on file.       No Kn Weight Concern: Not Asked    Social History Narrative      Not on file      Available pre-op labs reviewed. Vital Signs: Body mass index is 33.85 kg/m². height is 1.6 m (5' 3\") and weight is 86.7 kg (191 lb 1.6 oz).  Her oral temperatur difficulty breathing, infection, bleeding, nerve damage,itching, paralysis, death. The patient desires the proposed neuraxial anesthetic as planned.     KYAW العراقي  7/10/2020 7:32 AM

## 2020-07-10 NOTE — CONSULTS
[facility] HOSP - [facility]    Report of Consultation    Carmen Jeronimo Patient Status:  Outpatient in a Bed    1955 MRN [id number]   Location [address] [facility] Attending Neena Herndon MD   Hosp Day # 0 PCP Oralia Cortez she checks her pulse randomly. Patient has had multiple surgeries in the past without incident.     Past Medical History        Past Medical History:   Diagnosis Date   • Esophageal reflux    • Osteoarthritis    • PONV (postoperative nausea and vomiting) 10 MG/ML injection 6 mg, 6 mg, Intravenous, Q10 Min PRN  Atropine Sulfate 0.1 MG/ML injection 0.5 mg, 0.5 mg, Intravenous, PRN  ondansetron HCl (ZOFRAN) injection 4 mg, 4 mg, Intravenous, Once PRN  Prochlorperazine Edisylate (COMPAZINE) injection 5 mg, 5 m Intake/Output Summary (Last 24 hours) at 7/10/2020 1234  Last data filed at 7/10/2020 1123  Gross per 24 hour   Intake —   Output 50 ml   Net -50 ml      This shift: I/O this shift:  In: -   Out: 50 [Blood:50]       Scheduled Meds:    • amiodarone  0.5

## 2020-07-10 NOTE — HOME CARE LIAISON
Received referral from LAURIE/Rachael. Unable to accept due to staffing. Re-referred to [facility] [place].    4:14PM: [facility] able to accept. Reserved in 2029 Walter Meng.

## 2020-07-10 NOTE — BRIEF OP NOTE
Pre-Operative Diagnosis: failed right total knee arthroplasty     Post-Operative Diagnosis: failed right total knee arthroplasty      Procedure Performed:   Procedure(s):  revision right total knee arthroplasty    Surgeon(s) and Role:     * Vignesh Edwards

## 2020-07-10 NOTE — ANESTHESIA POSTPROCEDURE EVALUATION
Patient: Clint Jeronimo    Procedure Summary     Date:  07/10/20 Room / Location:  [facility] OR [address] [place] [address] OR    Anesthesia Start:  3771 Anesthesia Stop:      Procedure:  KNEE TOTAL REVISION (Right Knee) Diagnosis:  (failed right total knee arthropla

## 2020-07-10 NOTE — PROGRESS NOTES
[facility]    Progress Note    Shan Jeronimo Patient Status:  Outpatient in a Bed    1955 MRN [id number]   Location [address] [facility] Attending Mike Mercado MD   Hosp Day # 0 AYANNA Zafar Lab Results   Component Value Date    WBC 16.4 (H) 07/10/2020    HGB 13.1 07/10/2020    HCT 40.1 07/10/2020    .0 07/10/2020    CREATSERUM 0.62 11/14/2019    BUN 10 11/14/2019     11/14/2019    K 3.8 11/14/2019     11/14/2019    CO2

## 2020-07-10 NOTE — ANESTHESIA PROCEDURE NOTES
Spinal Block  Date/Time: 7/10/2020 8:36 AM  Performed by: Nando Ross MD  Authorized by: Nando Ross MD       General Information and Staff    Start Time:  7/10/2020 8:30 AM  End Time:  7/10/2020 8:36 AM  Anesthesiologist:  Nando Ross MD  Performed by:

## 2020-07-11 LAB
ANION GAP SERPL CALC-SCNC: 6 MMOL/L (ref 0–18)
BUN BLD-MCNC: 12 MG/DL (ref 7–18)
BUN/CREAT SERPL: 17.1 (ref 10–20)
CALCIUM BLD-MCNC: 9.2 MG/DL (ref 8.5–10.1)
CHLORIDE SERPL-SCNC: 105 MMOL/L (ref 98–112)
CO2 SERPL-SCNC: 28 MMOL/L (ref 21–32)
CREAT BLD-MCNC: 0.7 MG/DL (ref 0.55–1.02)
GLUCOSE BLD-MCNC: 144 MG/DL (ref 70–99)
HAV IGM SER QL: 2.2 MG/DL (ref 1.6–2.6)
OSMOLALITY SERPL CALC.SUM OF ELEC: 290 MOSM/KG (ref 275–295)
POTASSIUM SERPL-SCNC: 3.8 MMOL/L (ref 3.5–5.1)
POTASSIUM SERPL-SCNC: 3.9 MMOL/L (ref 3.5–5.1)
SODIUM SERPL-SCNC: 139 MMOL/L (ref 136–145)

## 2020-07-11 PROCEDURE — 99233 SBSQ HOSP IP/OBS HIGH 50: CPT | Performed by: HOSPITALIST

## 2020-07-11 RX ORDER — HYDROCODONE BITARTRATE AND ACETAMINOPHEN 5; 325 MG/1; MG/1
1 TABLET ORAL EVERY 4 HOURS PRN
Qty: 50 TABLET | Refills: 0 | Status: SHIPPED | OUTPATIENT
Start: 2020-07-11

## 2020-07-11 RX ORDER — POTASSIUM CHLORIDE 20 MEQ/1
40 TABLET, EXTENDED RELEASE ORAL ONCE
Status: COMPLETED | OUTPATIENT
Start: 2020-07-11 | End: 2020-07-11

## 2020-07-11 RX ORDER — MELATONIN
325
Qty: 20 TABLET | Refills: 0 | Status: SHIPPED | OUTPATIENT
Start: 2020-07-12

## 2020-07-11 RX ORDER — CELECOXIB 200 MG/1
200 CAPSULE ORAL EVERY 12 HOURS SCHEDULED
Qty: 60 CAPSULE | Refills: 0 | Status: SHIPPED | OUTPATIENT
Start: 2020-07-11

## 2020-07-11 NOTE — PLAN OF CARE
Problem: Patient Centered Care  Goal: Patient preferences are identified and integrated in the patient's plan of care  Description  Interventions:  - What would you like us to know as we care for you? \"I love at home with my .  I like to sing\"  - arrhythmias or at baseline  Description  INTERVENTIONS:  - Continuous cardiac monitoring, monitor vital signs, obtain 12 lead EKG if indicated  - Evaluate effectiveness of antiarrhythmic and heart rate control medications as ordered  - Initiate emergency m

## 2020-07-11 NOTE — PHYSICAL THERAPY NOTE
PHYSICAL THERAPY KNEE EVALUATION - INPATIENT       Room Number: 320/320-A  Evaluation Date: 7/11/2020  Type of Evaluation: Initial  Physician Order: PT Eval and Treat    Presenting Problem: s/p R TKA revision  Reason for Therapy: Mobility Dysfunction and D x 2 R seated heel slides to improve ROM, noted increased AROM from 50 deg to 95 deg. Pt progressed tx and ambulation with rolling walker at A.  Pt ambulated 120' with rolling walker at A, demonstrated R decreased stance time, flexed posture, increased B impairment     contacts     Past Surgical History  Past Surgical History:   Procedure Laterality Date   • ANESTH,SURGERY OF SHOULDER Right    •      • KNEE TOTAL REPLACEMENT Right 2018    Performed by Sherry Morgan MD at [address] [place] [address] OR   • Teofilo  SOB or signs of acute distress with mobility    PM SESSION-  Post-activity, seated:  HR 94 bpm  *mild fatigue with activity, improved with 2 min seated rest    AM-PAC '6-Clicks' INPATIENT SHORT FORM - BASIC MOBILITY  How much difficulty does the patient cu within reach;RN aware of session/findings; All patient questions and concerns addressed; Alarm set    CURRENT GOALS    Goals to be met by: 7/18/20  Patient Goal Patient's self-stated goal is: to be able to walk without knee pain or weakness   Goal #1 Patient

## 2020-07-11 NOTE — PLAN OF CARE
Nausea/vomiting resolved. Benadryl administered for itching. CPM for two hours overnight. K replaced. IV vanco adminsitered. Yusuf in place.  Plan for PT eval in AM.     Problem: Patient Centered Care  Goal: Patient preferences are identified and integrate signs of decreased coronary artery perfusion - ex.  Angina  - Evaluate fluid balance, assess for edema, trend weights  Outcome: Progressing  Goal: Absence of cardiac arrhythmias or at baseline  Description  INTERVENTIONS:  - Continuous cardiac monitoring, m

## 2020-07-11 NOTE — PROGRESS NOTES
[place] [facility] - [facility]    Progress Note    Alycia Jeronimo Patient Status:  Inpatient    1955 MRN [id number]   Location [facility] 3W/SW Attending Michael Lanier, [address] [facility] [address] Day # 1 PCP Oralia Taylor       Subjective:   Alycia Gross normal. No murmur or gallop. Telemetry -  NSR, rates 70-80's  Lungs: Clear without wheezes, rales, rhonchi. Normal excursions and effort. Abdomen: Soft, non-tender. No mass or rebound, BS-present x 4. Extremities: R knee bandaged which is c/d/i.    Diana enlargement. Voltage criteria for LVH met -Voltage criteria w/o ST/T abnormality may be normal.  - Negative precordial T-waves.  BORDERLINE When compared with ECG of 07/10/2020 11:51:13 No significant change Electronically signed on 07/10/2020 at 14:36 by

## 2020-07-11 NOTE — PROGRESS NOTES
[place] [facility] - [facility]    Progress Note    Wang Jeronimo Patient Status:  Inpatient    1955 MRN [id number]   Location [facility] 3W/SW Attending Alejandro Dos Santos, [address] [facility] Day # 1 PCP Oralia Taylor       Subjective:   Wang Pablo Finalized by (CST): Ian Cleaning MD on 7/10/2020 at 1:48 PM          Ekg 12-lead    Result Date: 7/10/2020  ECG Report  Interpretation  -------------------------- Sinus Rhythm -Left atrial enlargement.   Voltage criteria for LVH met -Voltage criteria w/o

## 2020-07-11 NOTE — OPERATIVE REPORT
[facility]    PATIENT'S NAME: Wilfrid Castro   ATTENDING PHYSICIAN: Keith Montes MD   OPERATING PHYSICIAN: Rafat Lobo MD   PATIENT ACCOUNT#:   [id number]    LOCATION:  [address] [place] [address] #:   [id number]       DATE OF BIR Esmarch bandage and the tourniquet inflated to 300 with the knee flexed. The old incision was used and extended a little bit proximally and distally. A standard medial parapatellar arthrotomy was performed.   Synovial fluid was sent for a cell count and c although she maintained a blood pressure. CPR was not necessary. He was able to convert her to sinus rhythm with amiodarone, and no electrical shock was given. He indicated that it was safe to continue and complete the procedure.   This essentially did n

## 2020-07-11 NOTE — OCCUPATIONAL THERAPY NOTE
OCCUPATIONAL THERAPY EVALUATION - INPATIENT      Room Number: 320/320-A  Evaluation Date: 7/11/2020  Type of Evaluation: Initial       Physician Order: IP Consult to Occupational Therapy  Reason for Therapy: ADL/IADL Dysfunction and Discharge Planning    O Problem:    Failed total knee, right, sequela      Past Medical History  Past Medical History:   Diagnosis Date   • Esophageal reflux    • Osteoarthritis    • PONV (postoperative nausea and vomiting)    • Visual impairment     contacts       Past Surgical clothing?: A Little  -   Taking care of personal grooming such as brushing teeth?: A Little  -   Eating meals?: None    AM-PAC Score:  Score: 18  Approx Degree of Impairment: 46.65%  Standardized Score (AM-PAC Scale): 38.66  CMS Modifier (G-Code): LISA BARAJAS

## 2020-07-11 NOTE — PROGRESS NOTES
[place] [facility] - [facility]    Progress Note    Wiliam Minoranselmovanda Aracelislisette Patient Status:  Outpatient in a Bed    1955 MRN [id number]   Location [facility] 3W/SW Attending Kirill Castellano, [address] [facility] Day # 0 PCP Oralia aTylor       Subjective: 81:10 by Felicia Olivares MD    Ekg 12-lead    Result Date: 7/10/2020  ECG Report  Interpretation  -------------------------- Sinus Rhythm WITHIN NORMAL LIMITS No previous ECGs available Electronically signed on 07/10/2020 at 12:17 by BEBA Abdalla

## 2020-07-11 NOTE — ANESTHESIA POST-OP FOLLOW-UP NOTE
Ms. Julian Boudreaux is POD#1 after her right knee revision performed under spinal anesthesia. Denies headache, back pain, or residual LE weakness/numbness. Was able to ambulate with PT this morning, up to chair this afternoon.    States she is having it

## 2020-07-12 VITALS
TEMPERATURE: 98 F | BODY MASS INDEX: 34.14 KG/M2 | HEART RATE: 100 BPM | SYSTOLIC BLOOD PRESSURE: 137 MMHG | DIASTOLIC BLOOD PRESSURE: 65 MMHG | WEIGHT: 192.69 LBS | RESPIRATION RATE: 18 BRPM | OXYGEN SATURATION: 96 % | HEIGHT: 63 IN

## 2020-07-12 LAB — POTASSIUM SERPL-SCNC: 3.9 MMOL/L (ref 3.5–5.1)

## 2020-07-12 PROCEDURE — 99239 HOSP IP/OBS DSCHRG MGMT >30: CPT | Performed by: HOSPITALIST

## 2020-07-12 NOTE — PHYSICAL THERAPY NOTE
PHYSICAL THERAPY KNEE TREATMENT NOTE - INPATIENT     Room Number: 420/420-A             Presenting Problem: s/p R TKA revision    Problem List  Principal Problem:    Failed total knee, right, sequela      PHYSICAL THERAPY ASSESSMENT     Pt was seen for PT O2 WALK                  AM-PAC '6-Clicks' INPATIENT SHORT FORM - BASIC MOBILITY  How much difficulty does the patient currently have. ..  -   Turning over in bed (including adjusting bedclothes, sheets and blankets)?: A Little   -   Sitting down on assistance level: modified independent     Goal #2  Current Status Pt performs tx with rolling walker at SBA   Goal #3 Patient is able to ambulate 150 feet with assistive device at assistance level: modified independent    Goal #3   Current Status Pt ambul

## 2020-07-12 NOTE — PROGRESS NOTES
CONNIE Ricci [address] [facility] Patient Status:  Inpatient    1955 MRN [id number]   Location [facility] 4W/SW/SE Attending Nabila De Jesus MD   Hosp Day # 2 PCP Oralia Taylor     Subjective:  Post-Operative Day: 2 Status Post righ

## 2020-07-12 NOTE — PLAN OF CARE
Comments: Pt alert, answering questions appropriately. Vitals monitored, sats well on R/A. RT#50; can be tachy with activity. Yusuf maintained, to be removed in AM. CPM @HS for 2 hours. Polar to site. Silver mepilex over site is c/d/I. OOB x1a/walker/wbat. vasoactive medications to optimize hemodynamic stability  - Monitor arterial and/or venous puncture sites for bleeding and/or hematoma  - Assess quality of pulses, skin color and temperature  - Assess for signs of decreased coronary artery perfusion - ex.

## 2020-07-12 NOTE — DISCHARGE SUMMARY
[place] [facility] - [facility]    Discharge Summary    Antione Jeronimo Patient Status:  Inpatient    1955 MRN [id number]   Location [facility] 4W/SW/SE Attending Makenna Lunsford, [address] [facility] Day # 2 PCP Oralia Taylor     Date of Admission the implants. She underwent a revision of her left knee implant in November of last year. She has done well and is ready to proceed with right knee revision. Hospital Course:      Failed total knee, right, sequela  Pt admitted after RIGHT KNEE REVISIOS 0     Diclofenac Sodium 1 % Gel  Commonly known as:  VOLTAREN      APPLY EXTERNALLY TO THE AFFECTED AREA FOUR TIMES DAILY   Refills:  0     famotidine 10 MG Tabs  Commonly known as:  PEPCID      Take 10 mg by mouth 2 (two) times daily as needed for Heartbu

## 2020-07-12 NOTE — PLAN OF CARE
PROGRESS ADEQUATE FOR DISCHARGE PER THERAPISTS, PATIENT STATED SHE IS READY TO GO HOME TODAY. PAIN MANAGED WITH ORAL PAIN MEDICATIONS.

## 2020-07-13 NOTE — PROGRESS NOTES
[place] [facility] - [facility]    Progress Note    Wang Jeronimo Patient Status:  Inpatient    1955 MRN [id number]   Location [facility] 3W/SW Attending Alejandro Dos Santos, [address] [facility]  Day # 2 PCP Oralia Taylor       Subjective:   Wang Pablo after 5 minutes possibly improved with amiodarone. Since no strips are available for review it is unclear if this was VT, bundle branch block or other arrhythmias. Echo - normal LV function. K level yesterday under 4.       PLAN   Ok home     Recheck e

## 2021-03-09 DIAGNOSIS — Z23 NEED FOR VACCINATION: ICD-10-CM

## 2022-12-07 ENCOUNTER — OFFICE VISIT (OUTPATIENT)
Dept: PHYSICAL MEDICINE AND REHAB | Facility: CLINIC | Age: 67
End: 2022-12-07
Payer: MEDICARE

## 2022-12-07 ENCOUNTER — MED REC SCAN ONLY (OUTPATIENT)
Dept: PHYSICAL MEDICINE AND REHAB | Facility: CLINIC | Age: 67
End: 2022-12-07

## 2022-12-07 ENCOUNTER — TELEPHONE (OUTPATIENT)
Dept: NEUROLOGY | Facility: CLINIC | Age: 67
End: 2022-12-07

## 2022-12-07 VITALS — HEIGHT: 63 IN | OXYGEN SATURATION: 97 % | WEIGHT: 192 LBS | HEART RATE: 102 BPM | BODY MASS INDEX: 34.02 KG/M2

## 2022-12-07 DIAGNOSIS — T84.018S FAILURE OF TOTAL KNEE REPLACEMENT, SEQUELA: ICD-10-CM

## 2022-12-07 DIAGNOSIS — Z96.659 FAILURE OF TOTAL KNEE REPLACEMENT, SEQUELA: ICD-10-CM

## 2022-12-07 DIAGNOSIS — M25.562 CHRONIC PAIN OF BOTH KNEES: ICD-10-CM

## 2022-12-07 DIAGNOSIS — E66.09 CLASS 1 OBESITY DUE TO EXCESS CALORIES WITHOUT SERIOUS COMORBIDITY WITH BODY MASS INDEX (BMI) OF 34.0 TO 34.9 IN ADULT: Primary | ICD-10-CM

## 2022-12-07 DIAGNOSIS — M25.561 CHRONIC PAIN OF BOTH KNEES: ICD-10-CM

## 2022-12-07 DIAGNOSIS — M70.50 PES ANSERINE BURSITIS: ICD-10-CM

## 2022-12-07 DIAGNOSIS — G89.29 CHRONIC PAIN OF BOTH KNEES: ICD-10-CM

## 2022-12-07 DIAGNOSIS — Z96.653 STATUS POST TOTAL BILATERAL KNEE REPLACEMENT: ICD-10-CM

## 2022-12-07 RX ORDER — TRIAMCINOLONE ACETONIDE 40 MG/ML
40 INJECTION, SUSPENSION INTRA-ARTICULAR; INTRAMUSCULAR ONCE
Status: COMPLETED | OUTPATIENT
Start: 2022-12-07 | End: 2022-12-07

## 2022-12-07 RX ORDER — MELOXICAM 15 MG/1
15 TABLET ORAL DAILY
COMMUNITY

## 2022-12-07 RX ORDER — LIDOCAINE HYDROCHLORIDE 10 MG/ML
2 INJECTION, SOLUTION INFILTRATION; PERINEURAL ONCE
Status: COMPLETED | OUTPATIENT
Start: 2022-12-07 | End: 2022-12-07

## 2022-12-07 NOTE — PATIENT INSTRUCTIONS
1) My office will call you to schedule the RIGHT pes anserine bursa CSI once the procedure is approved by your insurance carrier. 2) Continue therapy with an emphasis on pes anserinus  3) If injection is not helpful, then would recommend genicular nerve ablation  4) Follow up with me in about 4 weeks. Post Injection Instructions     Please do not do anything strenuous over the next two days (if you had a knee injection do not walk more than 2 city blocks, do not attend any aerobic classes, do not run, no heavy lifting, no prolong standing). You may resume your day to day activities after your injection. You may experience some mild amount of swelling after the procedure. Please ice your joint that was injected at least 5-6 times a day (15 minutes) for two days after (this will help prevent worsening pain that sometimes occurs after an injection). Only take tylenol if needed for pain for the first few days. Watch for signs of infection which include redness, warmth, worsening pain, fevers or chills. If you develop any of these signs call the office immediately at [phone]    Everyone responds differently to injections, but you can expect your peak effects a few weeks after your last injection. Jono Lara.  Keturah Pham MD  Physical Medicine and Rehabilitation/Sports Medicine  MEDICAL CENTER [facility]

## 2022-12-08 ENCOUNTER — TELEPHONE (OUTPATIENT)
Dept: PAIN CLINIC | Facility: HOSPITAL | Age: 67
End: 2022-12-08

## 2022-12-08 DIAGNOSIS — D36.13 NEUROMA OF RIGHT LOWER EXTREMITY: Primary | ICD-10-CM

## 2022-12-08 NOTE — TELEPHONE ENCOUNTER
Right pes anserine bursa CSI CPT Code: 65971,   Status: Authorization is not required per health plan however, may be subject to review once claim is submitted. Per Medicare Guidelines:pre-certification is not require. All Medicare coverage is based on Medical Necessity. Notified nusing for scheduling   FYI: Per CMS Guidelines: One to two levels, either unilateral or bilateral, are allowed per session per spine region. The need for a three or four-level procedure bilaterally may be considered under unique circumstances and with sufficient documentation of medical necessity on appeal. A session is a time period, which includes all procedures (i.e., medial branch block (MBB), intraarticular injections (IA), facet cyst ruptures, and RFA ablations that are performed during the same day.

## 2023-01-04 ENCOUNTER — OFFICE VISIT (OUTPATIENT)
Dept: PHYSICAL MEDICINE AND REHAB | Facility: CLINIC | Age: 68
End: 2023-01-04
Payer: MEDICARE

## 2023-01-04 VITALS — HEART RATE: 96 BPM | BODY MASS INDEX: 34.02 KG/M2 | WEIGHT: 192 LBS | OXYGEN SATURATION: 98 % | HEIGHT: 63 IN

## 2023-01-04 DIAGNOSIS — Z96.653 STATUS POST TOTAL BILATERAL KNEE REPLACEMENT: ICD-10-CM

## 2023-01-04 DIAGNOSIS — E66.09 CLASS 1 OBESITY DUE TO EXCESS CALORIES WITHOUT SERIOUS COMORBIDITY WITH BODY MASS INDEX (BMI) OF 34.0 TO 34.9 IN ADULT: Primary | ICD-10-CM

## 2023-01-04 DIAGNOSIS — Z96.659 FAILURE OF TOTAL KNEE REPLACEMENT, SEQUELA: ICD-10-CM

## 2023-01-04 DIAGNOSIS — T84.018S FAILURE OF TOTAL KNEE REPLACEMENT, SEQUELA: ICD-10-CM

## 2023-01-04 DIAGNOSIS — M25.562 CHRONIC PAIN OF BOTH KNEES: ICD-10-CM

## 2023-01-04 DIAGNOSIS — M25.561 CHRONIC PAIN OF BOTH KNEES: ICD-10-CM

## 2023-01-04 DIAGNOSIS — G89.29 CHRONIC PAIN OF BOTH KNEES: ICD-10-CM

## 2023-01-04 DIAGNOSIS — M70.50 PES ANSERINE BURSITIS: ICD-10-CM

## 2023-01-04 PROCEDURE — 99214 OFFICE O/P EST MOD 30 MIN: CPT | Performed by: PHYSICAL MEDICINE & REHABILITATION

## 2023-01-04 NOTE — PATIENT INSTRUCTIONS
1) Continue Meloxicam as needed  2) Tylenol 500-1000 mg every 6-8 hours as needed for pain. No more than 3000 mg daily.   3) We can repeat the pes anserine bursa injection at the 3 month mary ann if needed  4) Follow up with me in about 2 months

## 2023-01-10 ENCOUNTER — MED REC SCAN ONLY (OUTPATIENT)
Dept: PHYSICAL MEDICINE AND REHAB | Facility: CLINIC | Age: 68
End: 2023-01-10

## 2023-03-01 ENCOUNTER — OFFICE VISIT (OUTPATIENT)
Dept: PHYSICAL MEDICINE AND REHAB | Facility: CLINIC | Age: 68
End: 2023-03-01
Payer: MEDICARE

## 2023-03-01 ENCOUNTER — TELEPHONE (OUTPATIENT)
Dept: PHYSICAL MEDICINE AND REHAB | Facility: CLINIC | Age: 68
End: 2023-03-01

## 2023-03-01 VITALS — HEIGHT: 63 IN | BODY MASS INDEX: 34 KG/M2 | HEART RATE: 96 BPM | OXYGEN SATURATION: 96 %

## 2023-03-01 DIAGNOSIS — T84.018S FAILURE OF TOTAL KNEE REPLACEMENT, SEQUELA: ICD-10-CM

## 2023-03-01 DIAGNOSIS — E66.09 CLASS 1 OBESITY DUE TO EXCESS CALORIES WITHOUT SERIOUS COMORBIDITY WITH BODY MASS INDEX (BMI) OF 34.0 TO 34.9 IN ADULT: Primary | ICD-10-CM

## 2023-03-01 DIAGNOSIS — Z96.659 FAILURE OF TOTAL KNEE REPLACEMENT, SEQUELA: ICD-10-CM

## 2023-03-01 DIAGNOSIS — M70.50 PES ANSERINE BURSITIS: ICD-10-CM

## 2023-03-01 DIAGNOSIS — M25.562 CHRONIC PAIN OF BOTH KNEES: ICD-10-CM

## 2023-03-01 DIAGNOSIS — G89.29 CHRONIC PAIN OF BOTH KNEES: ICD-10-CM

## 2023-03-01 DIAGNOSIS — M25.561 CHRONIC PAIN OF BOTH KNEES: ICD-10-CM

## 2023-03-01 DIAGNOSIS — Z96.653 STATUS POST TOTAL BILATERAL KNEE REPLACEMENT: ICD-10-CM

## 2023-03-01 PROCEDURE — 99214 OFFICE O/P EST MOD 30 MIN: CPT | Performed by: PHYSICAL MEDICINE & REHABILITATION

## 2023-03-01 NOTE — TELEPHONE ENCOUNTER
Per Medicare Guidelines -no authorization is required for RIGHT pes anserine bursa CSI under ultrasound guidance CPT 91274,      Status: Authorization is not required however may be subject to review once claim is submitted-Covered Benefit     Contacted patient to schedule inj  Patient states Dr. Tyronne Spatz told her she can come in 3/20/23 at [facility]    Will have front office staff add patient to schedule

## 2023-03-01 NOTE — PATIENT INSTRUCTIONS
1) My office will call you to schedule the RIGHT pes anserine bursa CSI under ultrasound guidance once the procedure is approved by your insurance carrier.     Lets plan for 3/20 at noon    2) Continue with physical therapy

## 2023-03-13 ENCOUNTER — MED REC SCAN ONLY (OUTPATIENT)
Dept: PHYSICAL MEDICINE AND REHAB | Facility: CLINIC | Age: 68
End: 2023-03-13

## 2023-03-20 ENCOUNTER — OFFICE VISIT (OUTPATIENT)
Dept: PHYSICAL MEDICINE AND REHAB | Facility: CLINIC | Age: 68
End: 2023-03-20
Payer: MEDICARE

## 2023-03-20 DIAGNOSIS — M25.562 CHRONIC PAIN OF BOTH KNEES: ICD-10-CM

## 2023-03-20 DIAGNOSIS — Z96.653 STATUS POST TOTAL BILATERAL KNEE REPLACEMENT: ICD-10-CM

## 2023-03-20 DIAGNOSIS — T84.018S FAILURE OF TOTAL KNEE REPLACEMENT, SEQUELA: ICD-10-CM

## 2023-03-20 DIAGNOSIS — E66.09 CLASS 1 OBESITY DUE TO EXCESS CALORIES WITHOUT SERIOUS COMORBIDITY WITH BODY MASS INDEX (BMI) OF 34.0 TO 34.9 IN ADULT: ICD-10-CM

## 2023-03-20 DIAGNOSIS — M25.561 CHRONIC PAIN OF BOTH KNEES: ICD-10-CM

## 2023-03-20 DIAGNOSIS — Z96.659 FAILURE OF TOTAL KNEE REPLACEMENT, SEQUELA: ICD-10-CM

## 2023-03-20 DIAGNOSIS — M70.50 PES ANSERINE BURSITIS: Primary | ICD-10-CM

## 2023-03-20 DIAGNOSIS — G89.29 CHRONIC PAIN OF BOTH KNEES: ICD-10-CM

## 2023-03-20 NOTE — PATIENT INSTRUCTIONS
Post Injection Instructions     Please do not do anything strenuous over the next two days (if you had a knee injection do not walk more than 2 city blocks, do not attend any aerobic classes, do not run, no heavy lifting, no prolong standing). You may resume your day to day activities after your injection. You may experience some mild amount of swelling after the procedure. Please ice your joint that was injected at least 5-6 times a day (15 minutes) for two days after (this will help prevent worsening pain that sometimes occurs after an injection). Only take tylenol if needed for pain for the first few days. Watch for signs of infection which include redness, warmth, worsening pain, fevers or chills. If you develop any of these signs call the office immediately at [phone]    Everyone responds differently to injections, but you can expect your peak effects a few weeks after your last injection. Darshan Johansen.  Bud Child MD  Physical Medicine and Rehabilitation/Sports Medicine  MEDICAL CENTER [facility]

## 2023-04-12 ENCOUNTER — MED REC SCAN ONLY (OUTPATIENT)
Dept: PHYSICAL MEDICINE AND REHAB | Facility: CLINIC | Age: 68
End: 2023-04-12

## 2023-05-02 ENCOUNTER — TELEPHONE (OUTPATIENT)
Dept: PHYSICAL MEDICINE AND REHAB | Facility: CLINIC | Age: 68
End: 2023-05-02

## 2023-05-02 ENCOUNTER — OFFICE VISIT (OUTPATIENT)
Dept: PHYSICAL MEDICINE AND REHAB | Facility: CLINIC | Age: 68
End: 2023-05-02
Payer: MEDICARE

## 2023-05-02 VITALS
SYSTOLIC BLOOD PRESSURE: 130 MMHG | HEART RATE: 116 BPM | DIASTOLIC BLOOD PRESSURE: 88 MMHG | BODY MASS INDEX: 35.61 KG/M2 | WEIGHT: 201 LBS | OXYGEN SATURATION: 98 % | HEIGHT: 63 IN

## 2023-05-02 DIAGNOSIS — Z96.653 STATUS POST TOTAL BILATERAL KNEE REPLACEMENT: ICD-10-CM

## 2023-05-02 DIAGNOSIS — Z96.659 FAILURE OF TOTAL KNEE REPLACEMENT, SEQUELA: ICD-10-CM

## 2023-05-02 DIAGNOSIS — E66.09 CLASS 1 OBESITY DUE TO EXCESS CALORIES WITHOUT SERIOUS COMORBIDITY WITH BODY MASS INDEX (BMI) OF 34.0 TO 34.9 IN ADULT: ICD-10-CM

## 2023-05-02 DIAGNOSIS — M70.50 PES ANSERINE BURSITIS: Primary | ICD-10-CM

## 2023-05-02 DIAGNOSIS — M25.562 CHRONIC PAIN OF BOTH KNEES: ICD-10-CM

## 2023-05-02 DIAGNOSIS — T84.018S FAILURE OF TOTAL KNEE REPLACEMENT, SEQUELA: ICD-10-CM

## 2023-05-02 DIAGNOSIS — M25.561 CHRONIC PAIN OF BOTH KNEES: ICD-10-CM

## 2023-05-02 DIAGNOSIS — G89.29 CHRONIC PAIN OF BOTH KNEES: ICD-10-CM

## 2023-05-02 PROCEDURE — 99214 OFFICE O/P EST MOD 30 MIN: CPT | Performed by: PHYSICAL MEDICINE & REHABILITATION

## 2023-05-02 NOTE — PATIENT INSTRUCTIONS
1) My office will call you to schedule the RIGHT pes anserine bursa CSI under ultrasound guidance once the procedure is approved by your insurance carrier. This can be performed after June 20 2023  2) Continue Diclofenac gel 3-4 times per day  3) Continue Meloxicam 15 mg daily as needed  4) Tylenol 500-1000 mg every 6-8 hours as needed for pain. No more than 3000 mg daily.   5) Continue with therapy

## 2023-05-17 ENCOUNTER — MED REC SCAN ONLY (OUTPATIENT)
Dept: PHYSICAL MEDICINE AND REHAB | Facility: CLINIC | Age: 68
End: 2023-05-17

## 2023-06-06 ENCOUNTER — LAB ENCOUNTER (OUTPATIENT)
Dept: LAB | Facility: REFERENCE LAB | Age: 68
End: 2023-06-06
Attending: ORTHOPAEDIC SURGERY
Payer: MEDICARE

## 2023-06-06 ENCOUNTER — HOSPITAL ENCOUNTER (OUTPATIENT)
Dept: CT IMAGING | Facility: HOSPITAL | Age: 68
Discharge: HOME | End: 2023-06-06
Attending: ORTHOPAEDIC SURGERY
Payer: MEDICARE

## 2023-06-06 DIAGNOSIS — M25.561 CHRONIC PAIN OF RIGHT KNEE: ICD-10-CM

## 2023-06-06 DIAGNOSIS — Z96.651 HISTORY OF TOTAL RIGHT KNEE REPLACEMENT: ICD-10-CM

## 2023-06-06 DIAGNOSIS — G89.29 CHRONIC PAIN OF RIGHT KNEE: ICD-10-CM

## 2023-06-06 DIAGNOSIS — G89.29 CHRONIC KNEE PAIN AFTER TOTAL REPLACEMENT OF RIGHT KNEE JOINT: Primary | ICD-10-CM

## 2023-06-06 DIAGNOSIS — M25.561 CHRONIC KNEE PAIN AFTER TOTAL REPLACEMENT OF RIGHT KNEE JOINT: Primary | ICD-10-CM

## 2023-06-06 DIAGNOSIS — Z96.651 CHRONIC KNEE PAIN AFTER TOTAL REPLACEMENT OF RIGHT KNEE JOINT: Primary | ICD-10-CM

## 2023-06-06 LAB
CRP SERPL-MCNC: <0.29 MG/DL (ref ?–0.3)
ERYTHROCYTE [SEDIMENTATION RATE] IN BLOOD: 30 MM/HR

## 2023-06-06 PROCEDURE — 73700 CT LOWER EXTREMITY W/O DYE: CPT | Performed by: ORTHOPAEDIC SURGERY

## 2023-06-06 PROCEDURE — 85652 RBC SED RATE AUTOMATED: CPT

## 2023-06-06 PROCEDURE — 36415 COLL VENOUS BLD VENIPUNCTURE: CPT

## 2023-06-06 PROCEDURE — 86140 C-REACTIVE PROTEIN: CPT

## 2023-06-21 ENCOUNTER — OFFICE VISIT (OUTPATIENT)
Dept: PHYSICAL MEDICINE AND REHAB | Facility: CLINIC | Age: 68
End: 2023-06-21
Payer: MEDICARE

## 2023-06-21 DIAGNOSIS — Z96.659 FAILURE OF TOTAL KNEE REPLACEMENT, SEQUELA: Primary | ICD-10-CM

## 2023-06-21 DIAGNOSIS — T84.018S FAILURE OF TOTAL KNEE REPLACEMENT, SEQUELA: Primary | ICD-10-CM

## 2023-06-21 DIAGNOSIS — M70.50 PES ANSERINE BURSITIS: ICD-10-CM

## 2023-06-21 DIAGNOSIS — Z96.653 STATUS POST TOTAL BILATERAL KNEE REPLACEMENT: ICD-10-CM

## 2023-06-21 PROCEDURE — 99214 OFFICE O/P EST MOD 30 MIN: CPT | Performed by: PHYSICAL MEDICINE & REHABILITATION

## 2023-06-21 NOTE — PATIENT INSTRUCTIONS
1) Make an appointment with Dr. Anna Castillo ([address] at [facility]) to discuss possible revision. 2) Follow up with me if surgery is not indicated and we can consider the pes anserine bursa injection.

## 2023-06-22 ENCOUNTER — MED REC SCAN ONLY (OUTPATIENT)
Dept: PHYSICAL MEDICINE AND REHAB | Facility: CLINIC | Age: 68
End: 2023-06-22

## 2023-07-14 ENCOUNTER — TELEPHONE (OUTPATIENT)
Dept: ORTHOPEDICS CLINIC | Facility: CLINIC | Age: 68
End: 2023-07-14

## 2023-07-14 RX ORDER — MELOXICAM 15 MG/1
15 TABLET ORAL DAILY
Qty: 30 TABLET | Refills: 0 | Status: SHIPPED | OUTPATIENT
Start: 2023-07-14

## 2023-11-08 ENCOUNTER — LAB ENCOUNTER (OUTPATIENT)
Dept: LAB | Facility: HOSPITAL | Age: 68
End: 2023-11-08
Attending: ORTHOPAEDIC SURGERY
Payer: MEDICARE

## 2023-11-08 DIAGNOSIS — Z01.818 PRE-OP TESTING: ICD-10-CM

## 2023-11-08 LAB
ANTIBODY SCREEN: NEGATIVE
MRSA DNA SPEC QL NAA+PROBE: NEGATIVE
RH BLOOD TYPE: POSITIVE

## 2023-11-08 PROCEDURE — 86900 BLOOD TYPING SEROLOGIC ABO: CPT

## 2023-11-08 PROCEDURE — 87641 MR-STAPH DNA AMP PROBE: CPT

## 2023-11-08 PROCEDURE — 86901 BLOOD TYPING SEROLOGIC RH(D): CPT

## 2023-11-08 PROCEDURE — 36415 COLL VENOUS BLD VENIPUNCTURE: CPT

## 2023-11-08 PROCEDURE — 86850 RBC ANTIBODY SCREEN: CPT

## 2023-11-08 RX ORDER — AMLODIPINE BESYLATE 5 MG/1
5 TABLET ORAL NIGHTLY
COMMUNITY

## 2023-11-08 RX ORDER — MAGNESIUM OXIDE 400 MG/1
400 TABLET ORAL DAILY
COMMUNITY

## 2023-11-10 ENCOUNTER — APPOINTMENT (OUTPATIENT)
Dept: GENERAL RADIOLOGY | Facility: HOSPITAL | Age: 68
End: 2023-11-10
Payer: MEDICARE

## 2023-11-10 ENCOUNTER — ANESTHESIA (OUTPATIENT)
Dept: SURGERY | Facility: HOSPITAL | Age: 68
End: 2023-11-10
Payer: MEDICARE

## 2023-11-10 ENCOUNTER — HOSPITAL ENCOUNTER (INPATIENT)
Facility: HOSPITAL | Age: 68
LOS: 2 days | Discharge: HOME HEALTH | End: 2023-11-12
Attending: ORTHOPAEDIC SURGERY | Admitting: ORTHOPAEDIC SURGERY
Payer: MEDICARE

## 2023-11-10 ENCOUNTER — ANESTHESIA EVENT (OUTPATIENT)
Dept: SURGERY | Facility: HOSPITAL | Age: 68
End: 2023-11-10
Payer: MEDICARE

## 2023-11-10 DIAGNOSIS — Z01.818 PRE-OP TESTING: Primary | ICD-10-CM

## 2023-11-10 PROBLEM — I10 ESSENTIAL HYPERTENSION: Status: ACTIVE | Noted: 2023-11-10

## 2023-11-10 LAB
BASOPHILS NFR SNV: 1 %
EOSINOPHIL NFR SNV: 0 %
LYMPHOCYTES NFR SNV: 72 %
MONOS+MACROS NFR SNV: 21 %
NEUTROPHILS NFR FLD: 6 %
RBC # FLD AUTO: ABNORMAL /CUMM (ref ?–1)
TOTAL CELLS COUNTED FLD: 100
TOTAL CELLS COUNTED SNV: 260 /CUMM (ref 0–200)
WBC # SNV: 260 /CUMM

## 2023-11-10 PROCEDURE — 0SPC0JZ REMOVAL OF SYNTHETIC SUBSTITUTE FROM RIGHT KNEE JOINT, OPEN APPROACH: ICD-10-PCS | Performed by: ORTHOPAEDIC SURGERY

## 2023-11-10 PROCEDURE — 99222 1ST HOSP IP/OBS MODERATE 55: CPT | Performed by: HOSPITALIST

## 2023-11-10 PROCEDURE — 73560 X-RAY EXAM OF KNEE 1 OR 2: CPT

## 2023-11-10 PROCEDURE — 0SRC0J9 REPLACEMENT OF RIGHT KNEE JOINT WITH SYNTHETIC SUBSTITUTE, CEMENTED, OPEN APPROACH: ICD-10-PCS | Performed by: ORTHOPAEDIC SURGERY

## 2023-11-10 DEVICE — PROXIMAL TIBIAL SPACER,HALF, INCL. FLAT HEAD SCREW HEX 2.5 MM
Type: IMPLANTABLE DEVICE | Site: KNEE | Status: FUNCTIONAL
Brand: ENDO-MODEL MODULAR KNEE PROSTHESIS SYSTEM

## 2023-11-10 DEVICE — TRABECULINK FEMORAL CONES / PROXIMAL
Type: IMPLANTABLE DEVICE | Site: KNEE | Status: FUNCTIONAL
Brand: TRABECULINK FEMORAL CONES

## 2023-11-10 DEVICE — MODULAR STEM,WITH FEMORAL TRAPER AND 6 MM NOSES
Type: IMPLANTABLE DEVICE | Site: KNEE | Status: FUNCTIONAL
Brand: ENDO-MODEL SL KNEE PROSTHESIS SYSTEM

## 2023-11-10 DEVICE — IMPLANTABLE DEVICE: Type: IMPLANTABLE DEVICE | Status: FUNCTIONAL

## 2023-11-10 DEVICE — IMPLANTABLE DEVICE
Type: IMPLANTABLE DEVICE | Site: KNEE | Status: FUNCTIONAL
Brand: REFOBACIN® BONE CEMENT R

## 2023-11-10 RX ORDER — NALOXONE HYDROCHLORIDE 0.4 MG/ML
0.08 INJECTION, SOLUTION INTRAMUSCULAR; INTRAVENOUS; SUBCUTANEOUS AS NEEDED
Status: DISCONTINUED | OUTPATIENT
Start: 2023-11-10 | End: 2023-11-10 | Stop reason: HOSPADM

## 2023-11-10 RX ORDER — CEFAZOLIN SODIUM/WATER 2 G/20 ML
2 SYRINGE (ML) INTRAVENOUS ONCE
Status: COMPLETED | OUTPATIENT
Start: 2023-11-10 | End: 2023-11-10

## 2023-11-10 RX ORDER — MORPHINE SULFATE 10 MG/ML
6 INJECTION, SOLUTION INTRAMUSCULAR; INTRAVENOUS EVERY 10 MIN PRN
Status: DISCONTINUED | OUTPATIENT
Start: 2023-11-10 | End: 2023-11-10 | Stop reason: HOSPADM

## 2023-11-10 RX ORDER — AMLODIPINE BESYLATE 5 MG/1
5 TABLET ORAL NIGHTLY
Status: DISCONTINUED | OUTPATIENT
Start: 2023-11-10 | End: 2023-11-12

## 2023-11-10 RX ORDER — VANCOMYCIN HYDROCHLORIDE
15 EVERY 8 HOURS
Status: DISCONTINUED | OUTPATIENT
Start: 2023-11-10 | End: 2023-11-10

## 2023-11-10 RX ORDER — HYDROMORPHONE HYDROCHLORIDE 1 MG/ML
0.4 INJECTION, SOLUTION INTRAMUSCULAR; INTRAVENOUS; SUBCUTANEOUS EVERY 5 MIN PRN
Status: DISCONTINUED | OUTPATIENT
Start: 2023-11-10 | End: 2023-11-10 | Stop reason: HOSPADM

## 2023-11-10 RX ORDER — LIDOCAINE HYDROCHLORIDE 10 MG/ML
INJECTION, SOLUTION EPIDURAL; INFILTRATION; INTRACAUDAL; PERINEURAL AS NEEDED
Status: DISCONTINUED | OUTPATIENT
Start: 2023-11-10 | End: 2023-11-10 | Stop reason: SURG

## 2023-11-10 RX ORDER — SODIUM CHLORIDE, SODIUM LACTATE, POTASSIUM CHLORIDE, CALCIUM CHLORIDE 600; 310; 30; 20 MG/100ML; MG/100ML; MG/100ML; MG/100ML
INJECTION, SOLUTION INTRAVENOUS CONTINUOUS
Status: DISCONTINUED | OUTPATIENT
Start: 2023-11-10 | End: 2023-11-12

## 2023-11-10 RX ORDER — HYDROCODONE BITARTRATE AND ACETAMINOPHEN 10; 325 MG/1; MG/1
1 TABLET ORAL EVERY 4 HOURS PRN
Status: DISCONTINUED | OUTPATIENT
Start: 2023-11-10 | End: 2023-11-12

## 2023-11-10 RX ORDER — ACETAMINOPHEN 500 MG
1000 TABLET ORAL ONCE
Status: COMPLETED | OUTPATIENT
Start: 2023-11-10 | End: 2023-11-10

## 2023-11-10 RX ORDER — HYDROMORPHONE HYDROCHLORIDE 1 MG/ML
0.2 INJECTION, SOLUTION INTRAMUSCULAR; INTRAVENOUS; SUBCUTANEOUS EVERY 5 MIN PRN
Status: DISCONTINUED | OUTPATIENT
Start: 2023-11-10 | End: 2023-11-10 | Stop reason: HOSPADM

## 2023-11-10 RX ORDER — POLYETHYLENE GLYCOL 3350 17 G/17G
17 POWDER, FOR SOLUTION ORAL DAILY PRN
Status: DISCONTINUED | OUTPATIENT
Start: 2023-11-10 | End: 2023-11-12

## 2023-11-10 RX ORDER — VANCOMYCIN HYDROCHLORIDE
15 ONCE
Status: COMPLETED | OUTPATIENT
Start: 2023-11-10 | End: 2023-11-10

## 2023-11-10 RX ORDER — DIPHENHYDRAMINE HYDROCHLORIDE 50 MG/ML
12.5 INJECTION INTRAMUSCULAR; INTRAVENOUS EVERY 4 HOURS PRN
Status: DISCONTINUED | OUTPATIENT
Start: 2023-11-10 | End: 2023-11-12

## 2023-11-10 RX ORDER — DEXAMETHASONE SODIUM PHOSPHATE 4 MG/ML
VIAL (ML) INJECTION AS NEEDED
Status: DISCONTINUED | OUTPATIENT
Start: 2023-11-10 | End: 2023-11-10 | Stop reason: SURG

## 2023-11-10 RX ORDER — METOCLOPRAMIDE 10 MG/1
10 TABLET ORAL ONCE
Status: COMPLETED | OUTPATIENT
Start: 2023-11-10 | End: 2023-11-10

## 2023-11-10 RX ORDER — ONDANSETRON 2 MG/ML
4 INJECTION INTRAMUSCULAR; INTRAVENOUS EVERY 6 HOURS PRN
Status: DISCONTINUED | OUTPATIENT
Start: 2023-11-10 | End: 2023-11-12

## 2023-11-10 RX ORDER — BUPIVACAINE HYDROCHLORIDE 5 MG/ML
INJECTION, SOLUTION EPIDURAL; INTRACAUDAL
Status: COMPLETED | OUTPATIENT
Start: 2023-11-10 | End: 2023-11-10

## 2023-11-10 RX ORDER — FAMOTIDINE 20 MG/1
20 TABLET, FILM COATED ORAL ONCE
Status: DISCONTINUED | OUTPATIENT
Start: 2023-11-10 | End: 2023-11-10 | Stop reason: HOSPADM

## 2023-11-10 RX ORDER — GLYCOPYRROLATE 0.2 MG/ML
INJECTION, SOLUTION INTRAMUSCULAR; INTRAVENOUS AS NEEDED
Status: DISCONTINUED | OUTPATIENT
Start: 2023-11-10 | End: 2023-11-10 | Stop reason: SURG

## 2023-11-10 RX ORDER — DIPHENHYDRAMINE HCL 25 MG
25 CAPSULE ORAL EVERY 4 HOURS PRN
Status: DISCONTINUED | OUTPATIENT
Start: 2023-11-10 | End: 2023-11-12

## 2023-11-10 RX ORDER — ONDANSETRON 2 MG/ML
INJECTION INTRAMUSCULAR; INTRAVENOUS AS NEEDED
Status: DISCONTINUED | OUTPATIENT
Start: 2023-11-10 | End: 2023-11-10 | Stop reason: SURG

## 2023-11-10 RX ORDER — FAMOTIDINE 10 MG/ML
20 INJECTION, SOLUTION INTRAVENOUS ONCE
Status: DISCONTINUED | OUTPATIENT
Start: 2023-11-10 | End: 2023-11-10 | Stop reason: HOSPADM

## 2023-11-10 RX ORDER — CELECOXIB 200 MG/1
200 CAPSULE ORAL DAILY
Status: DISCONTINUED | OUTPATIENT
Start: 2023-11-11 | End: 2023-11-12

## 2023-11-10 RX ORDER — HYDROMORPHONE HYDROCHLORIDE 1 MG/ML
0.6 INJECTION, SOLUTION INTRAMUSCULAR; INTRAVENOUS; SUBCUTANEOUS EVERY 5 MIN PRN
Status: DISCONTINUED | OUTPATIENT
Start: 2023-11-10 | End: 2023-11-10 | Stop reason: HOSPADM

## 2023-11-10 RX ORDER — ENEMA 19; 7 G/133ML; G/133ML
1 ENEMA RECTAL ONCE AS NEEDED
Status: DISCONTINUED | OUTPATIENT
Start: 2023-11-10 | End: 2023-11-12

## 2023-11-10 RX ORDER — FAMOTIDINE 20 MG/1
20 TABLET, FILM COATED ORAL 2 TIMES DAILY
Status: DISCONTINUED | OUTPATIENT
Start: 2023-11-10 | End: 2023-11-10

## 2023-11-10 RX ORDER — PANTOPRAZOLE SODIUM 40 MG/1
40 TABLET, DELAYED RELEASE ORAL
Status: DISCONTINUED | OUTPATIENT
Start: 2023-11-11 | End: 2023-11-12

## 2023-11-10 RX ORDER — LIDOCAINE HYDROCHLORIDE 10 MG/ML
INJECTION, SOLUTION INFILTRATION; PERINEURAL
Status: COMPLETED | OUTPATIENT
Start: 2023-11-10 | End: 2023-11-10

## 2023-11-10 RX ORDER — CELECOXIB 200 MG/1
200 CAPSULE ORAL ONCE
Status: COMPLETED | OUTPATIENT
Start: 2023-11-10 | End: 2023-11-10

## 2023-11-10 RX ORDER — SODIUM CHLORIDE, SODIUM LACTATE, POTASSIUM CHLORIDE, CALCIUM CHLORIDE 600; 310; 30; 20 MG/100ML; MG/100ML; MG/100ML; MG/100ML
INJECTION, SOLUTION INTRAVENOUS CONTINUOUS
Status: DISCONTINUED | OUTPATIENT
Start: 2023-11-10 | End: 2023-11-10 | Stop reason: HOSPADM

## 2023-11-10 RX ORDER — METOCLOPRAMIDE HYDROCHLORIDE 5 MG/ML
10 INJECTION INTRAMUSCULAR; INTRAVENOUS EVERY 8 HOURS PRN
Status: DISCONTINUED | OUTPATIENT
Start: 2023-11-10 | End: 2023-11-10 | Stop reason: HOSPADM

## 2023-11-10 RX ORDER — ONDANSETRON 2 MG/ML
4 INJECTION INTRAMUSCULAR; INTRAVENOUS EVERY 6 HOURS PRN
Status: DISCONTINUED | OUTPATIENT
Start: 2023-11-10 | End: 2023-11-10 | Stop reason: HOSPADM

## 2023-11-10 RX ORDER — VANCOMYCIN HYDROCHLORIDE
15 EVERY 12 HOURS
Status: DISCONTINUED | OUTPATIENT
Start: 2023-11-11 | End: 2023-11-12

## 2023-11-10 RX ORDER — MIDAZOLAM HYDROCHLORIDE 1 MG/ML
INJECTION INTRAMUSCULAR; INTRAVENOUS AS NEEDED
Status: DISCONTINUED | OUTPATIENT
Start: 2023-11-10 | End: 2023-11-10 | Stop reason: SURG

## 2023-11-10 RX ORDER — CYCLOBENZAPRINE HCL 10 MG
10 TABLET ORAL EVERY 8 HOURS PRN
Status: DISCONTINUED | OUTPATIENT
Start: 2023-11-10 | End: 2023-11-12

## 2023-11-10 RX ORDER — SENNOSIDES 8.6 MG
17.2 TABLET ORAL NIGHTLY
Status: DISCONTINUED | OUTPATIENT
Start: 2023-11-10 | End: 2023-11-12

## 2023-11-10 RX ORDER — MORPHINE SULFATE 1 MG/ML
INJECTION, SOLUTION EPIDURAL; INTRATHECAL; INTRAVENOUS
Status: COMPLETED | OUTPATIENT
Start: 2023-11-10 | End: 2023-11-10

## 2023-11-10 RX ORDER — HYDROMORPHONE HYDROCHLORIDE 1 MG/ML
0.5 INJECTION, SOLUTION INTRAMUSCULAR; INTRAVENOUS; SUBCUTANEOUS EVERY 4 HOURS PRN
Status: DISCONTINUED | OUTPATIENT
Start: 2023-11-10 | End: 2023-11-12

## 2023-11-10 RX ORDER — DIPHENHYDRAMINE HYDROCHLORIDE 50 MG/ML
25 INJECTION INTRAMUSCULAR; INTRAVENOUS ONCE AS NEEDED
Status: ACTIVE | OUTPATIENT
Start: 2023-11-10 | End: 2023-11-10

## 2023-11-10 RX ORDER — FAMOTIDINE 10 MG/ML
20 INJECTION, SOLUTION INTRAVENOUS 2 TIMES DAILY
Status: DISCONTINUED | OUTPATIENT
Start: 2023-11-10 | End: 2023-11-10

## 2023-11-10 RX ORDER — BISACODYL 10 MG
10 SUPPOSITORY, RECTAL RECTAL
Status: DISCONTINUED | OUTPATIENT
Start: 2023-11-10 | End: 2023-11-12

## 2023-11-10 RX ORDER — ACETAMINOPHEN 500 MG
1000 TABLET ORAL EVERY 8 HOURS PRN
Status: DISCONTINUED | OUTPATIENT
Start: 2023-11-10 | End: 2023-11-12

## 2023-11-10 RX ORDER — MORPHINE SULFATE 4 MG/ML
2 INJECTION, SOLUTION INTRAMUSCULAR; INTRAVENOUS EVERY 10 MIN PRN
Status: DISCONTINUED | OUTPATIENT
Start: 2023-11-10 | End: 2023-11-10 | Stop reason: HOSPADM

## 2023-11-10 RX ORDER — METOCLOPRAMIDE HYDROCHLORIDE 5 MG/ML
10 INJECTION INTRAMUSCULAR; INTRAVENOUS EVERY 8 HOURS PRN
Status: DISCONTINUED | OUTPATIENT
Start: 2023-11-10 | End: 2023-11-12

## 2023-11-10 RX ORDER — METOCLOPRAMIDE HYDROCHLORIDE 5 MG/ML
10 INJECTION INTRAMUSCULAR; INTRAVENOUS ONCE
Status: COMPLETED | OUTPATIENT
Start: 2023-11-10 | End: 2023-11-10

## 2023-11-10 RX ORDER — HYDROCODONE BITARTRATE AND ACETAMINOPHEN 5; 325 MG/1; MG/1
1 TABLET ORAL EVERY 4 HOURS PRN
Status: DISCONTINUED | OUTPATIENT
Start: 2023-11-10 | End: 2023-11-12

## 2023-11-10 RX ORDER — TRANEXAMIC ACID 650 MG/1
1300 TABLET ORAL ONCE
Status: COMPLETED | OUTPATIENT
Start: 2023-11-10 | End: 2023-11-10

## 2023-11-10 RX ORDER — MORPHINE SULFATE 4 MG/ML
4 INJECTION, SOLUTION INTRAMUSCULAR; INTRAVENOUS EVERY 10 MIN PRN
Status: DISCONTINUED | OUTPATIENT
Start: 2023-11-10 | End: 2023-11-10 | Stop reason: HOSPADM

## 2023-11-10 RX ORDER — PHENYLEPHRINE HCL 10 MG/ML
VIAL (ML) INJECTION AS NEEDED
Status: DISCONTINUED | OUTPATIENT
Start: 2023-11-10 | End: 2023-11-10 | Stop reason: SURG

## 2023-11-10 RX ORDER — TRAMADOL HYDROCHLORIDE 50 MG/1
50 TABLET ORAL EVERY 6 HOURS PRN
Status: DISCONTINUED | OUTPATIENT
Start: 2023-11-10 | End: 2023-11-12

## 2023-11-10 RX ORDER — DOCUSATE SODIUM 100 MG/1
100 CAPSULE, LIQUID FILLED ORAL 2 TIMES DAILY
Status: DISCONTINUED | OUTPATIENT
Start: 2023-11-10 | End: 2023-11-12

## 2023-11-10 RX ADMIN — SODIUM CHLORIDE, SODIUM LACTATE, POTASSIUM CHLORIDE, CALCIUM CHLORIDE: 600; 310; 30; 20 INJECTION, SOLUTION INTRAVENOUS at 14:09:00

## 2023-11-10 RX ADMIN — LIDOCAINE HYDROCHLORIDE 50 MG: 10 INJECTION, SOLUTION EPIDURAL; INFILTRATION; INTRACAUDAL; PERINEURAL at 14:20:00

## 2023-11-10 RX ADMIN — CEFAZOLIN SODIUM/WATER 2 G: 2 G/20 ML SYRINGE (ML) INTRAVENOUS at 14:12:00

## 2023-11-10 RX ADMIN — GLYCOPYRROLATE 0.2 MG: 0.2 INJECTION, SOLUTION INTRAMUSCULAR; INTRAVENOUS at 14:35:00

## 2023-11-10 RX ADMIN — DEXAMETHASONE SODIUM PHOSPHATE 4 MG: 4 MG/ML VIAL (ML) INJECTION at 14:25:00

## 2023-11-10 RX ADMIN — BUPIVACAINE HYDROCHLORIDE 2.5 ML: 5 INJECTION, SOLUTION EPIDURAL; INTRACAUDAL at 14:21:00

## 2023-11-10 RX ADMIN — PHENYLEPHRINE HCL 100 MCG: 10 MG/ML VIAL (ML) INJECTION at 15:11:00

## 2023-11-10 RX ADMIN — LIDOCAINE HYDROCHLORIDE 3 ML: 10 INJECTION, SOLUTION INFILTRATION; PERINEURAL at 14:20:00

## 2023-11-10 RX ADMIN — MIDAZOLAM HYDROCHLORIDE 2 MG: 1 INJECTION INTRAMUSCULAR; INTRAVENOUS at 14:18:00

## 2023-11-10 RX ADMIN — MORPHINE SULFATE 0.3 MG: 1 INJECTION, SOLUTION EPIDURAL; INTRATHECAL; INTRAVENOUS at 14:21:00

## 2023-11-10 RX ADMIN — ONDANSETRON 4 MG: 2 INJECTION INTRAMUSCULAR; INTRAVENOUS at 14:25:00

## 2023-11-10 RX ADMIN — PHENYLEPHRINE HCL 100 MCG: 10 MG/ML VIAL (ML) INJECTION at 14:38:00

## 2023-11-10 NOTE — OPERATIVE REPORT
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 Postop DX: Same  Procedure:  Revision right total knee arthroplasty-- Full revision  Surgeon: Lon Chisholm MD  Assistants:  Eric Pace; Javi Escobar  Anesthesia: Spinal Anesthesia  EBL: 150 mL  Tourniquet Time: 86 minutes  Fluids: 2000 mL  Findings: Failed total knee arthroplasty secondary to: Aseptic loosening  Cell count: Negative  Frozen section: Negative  Specimens: Capsule for frozen section, fluid for cell count and culture, and the old implants  Drain: None  Implants: Link Endo Model  Complications: none  All needle and sponge counts correct prior to leaving the operating room. Plan: Transfer to recovery room in stable condition. Transition to floor when stable.        Lon Chisholm MD

## 2023-11-10 NOTE — ANESTHESIA POSTPROCEDURE EVALUATION
Patient: Heidi Jeronimo    Procedure Summary       Date: 11/10/23 Room / Location: [address] [place] [address] MAIN OR 05 / [address] [place] [address] MAIN OR    Anesthesia Start: 1408 Anesthesia Stop:     Procedure: Revision, right total knee arthroplasty (Right: Knee) Diagnosis: (Right knee loosening)    Surgeons: Imani Laureano MD Anesthesiologist: Dayana Shipley MD    Anesthesia Type: spinal, MAC, general ASA Status: 2            Anesthesia Type: spinal, MAC, general    Vitals Value Taken Time   /58 11/10/23 1644   Temp 98 11/10/23 1644   Pulse 104 11/10/23 1643   Resp 27 11/10/23 1643   SpO2 97 % 11/10/23 1643   Vitals shown include unfiled device data.     [address] [place] [address] AN Post Evaluation:   Patient Evaluated in PACU  Patient Participation: complete - patient participated  Level of Consciousness: awake  Pain Score: 0  Pain Management: adequate  Airway Patency:patent  Dental exam unchanged from preop  Yes    Cardiovascular Status: hemodynamically stable  Respiratory Status: face mask  Postoperative Hydration carolina Josue MD  11/10/2023 4:44 PM

## 2023-11-10 NOTE — OPERATIVE REPORT
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 Postop DX: Same  Procedure:  Revision right total knee arthroplasty-- Full revision  Surgeon: Kristi Palm MD  Assistants:  Eric Pace; Javi Escobar  Anesthesia: Spinal Anesthesia  EBL: 150 mL  Tourniquet Time: 86 minutes  Fluids: 2000 mL  Findings: Failed total knee arthroplasty secondary to: Aseptic loosening  Cell count: Negative  Frozen section: Negative  Specimens: Capsule for frozen section, fluid for cell count and culture, and the old implants  Drain: None  Implants: Link Endo Model  Complications: none  All needle and sponge counts correct prior to leaving the operating room. Plan: Transfer to recovery room in stable condition. Transition to floor when stable. INDICATIONS: DX: Rivka Arreguin is a 77year old year old female with a history of a previous revision right total knee arthroplasty with persistent pain and swelling. Her radiographs in the office showed aseptic loosening of the femoral and tibial components. She was indicated for full revision of her failed right total knee arthroplasty. They have been cleared for surgery by their primary care physician and all forms of non-operative management have been attempted without success. All risks, benefits and alternatives for the procedure have been discussed and informed consent obtained. There are no contraindications to proceed with the surgery today. The risks discussed include but are not limited to: infection, nerve injury (peroneal palsy and superficial numbness specifically), vessel damage, VTE, need for re-operation, knee stiffness, loss of limb and loss of life.   Furthermore this is a revision procedure and the patient understands there are no guarantees and revision surgery is more complicated with a greater risk than primary TKA. They understand we will try our best to restore them to normal function with this revision procedure. On 11/10/23, Ronak Mohr was identified in the holding and taken to the operating room. After perioperative antibiotics were administered (Ancef and vancomycin) the patient was given a spinal anesthetic and placed supine on the operating room table. A frost catheter was placed under sterile conditions and a well-padded tourniquet placed on the right upper thigh. At this time a timeout was called and it was noted the right side was the appropriate side for the surgery and we were allowed to proceed. We then exsanguinated the right lower extremity and elevated the tourniquet to 250 mm pressure. We then directed our attention to the exposure. APPROACH:    With the tourniquet elevated we then made an incision thru the previous surgical scar, extending it proximally and distally. We then dissected sharply down to the capsule and elevated medial and lateral flaps that were full thickness. The knee was then aspirated for a synovial fluid sample. We obtained approximately 10 cc of serosanguineous fluid which was sent to the lab for a cell count with differential and culture. We then entered the knee joint via a modified medial parapatellar approach in an attempt to follow the old arthrotomy. Once entering the knee joint we re-established the medial and lateral gutters sending this tissue off for a frozen section. A medial release was performed and thorough debridement was performed of the joint. We then removed the polyethylene liner. Once this was done we found, both the femoral and tibial components were grossly loose. PROCEDURE:    Once exposed and the knee fully assessed we then attempted to address the pathology.   Once we had good exposure we went ahead and used our extractor to tap on the femoral component which was grossly loose and came out quite easily. We then performed a thorough debridement of the synovium using a Bovie cautery and rongeur sharply. We removed any tissue that was clearly scar tissue and in the way. Once this was done we then went ahead and tapped on the tibial component after getting good exposure and noted to a component was grossly loose as well remove this without any bone loss. We then went ahead and directed attention towards prepping her tibia. We sized the tibia noted a size small Endo model would be the appropriate size. We then debrided the medullary canal using a cement drill and a series of back cutting curettes. We were then able to ream up to a size 18 x 160 for a press-fit stem for this implant. We did note that a 45 mm cone help fill the metaphyseal defect that we had. We reamed and broached for this cone placed this trial and then placed our small link Endo model with a 18 mm x 160 trial stem and two 5 mm augments to make up for the significant polyethylene that was removed. We then went ahead and directed attention towards the femur and noted the femur was also size small debrided the ends of the femur appropriately and cut a box for a small Link Endo model implant. We able to ream up to a 17 mm x 160 mm femoral stem after the femoral canal and then thoroughly debrided using a canal curettes. We noted we need to be 20 mm augments on each of the medial lateral sides. We also noted there was a metaphyseal defect and we able to ream and broach for a 30 mm proximal femoral cone. This trial was placed as well and the femur was inserted lined up with the epicondylar axis. We then reduced the hinge and noted we had good range of motion and good stability throughout an arc of motion. The patella tracked nicely. We felt that this could be our final implant.        Once this was done we took the knee through range of motion again and will be a good range of motion, good stability and good patella tracking. At this time the cell count and frozen section both came back negative and we planned for reimplantation. We then opened the implants and assembled them on the back table. At this time we performed a dilute Betadine soak and this was and followed by irrigation with pulsatile lavage while the cement was being mixed and the implants assembled. We then went ahead and cemented the tibial component in the place first centering it over the medial third of the tibia tubercle with our size small tibia and a 19 mm x 160 mm press-fit stem extension. The tibial component was cemented through a 45 mm porous titanium cone getting good hybrid fixation with the cement proximal press-fit distal.  Once this was in place we removed all excessive cement. We then cemented our femoral component in a similar matter using a size small femoral component with a 17 mm x 160 mm press-fit stem extension, and this was impacted into place. Femoral component was cemented through a 30 mm porous titanium cone. The 220 mm augments were also placed on the femur prior to cementing. Once it was fully seated we cleaned all excessive cement away and placed a standard Link Endo model small polyethylene insert within the knee and placed in extension to allow the cement to cure under pressurization. Once this was done we allowed the cement to fully harden. Once the cement was fully hardened and all excessive cement was removed. We went ahead and inserted our final size small link Endo model polyethylene insert. Once this was locked in place we irrigated the wound with time and directed attention towards closure. CLOSURE:    After all implants were secured in place we directed our attention to the closure of the wound. The knee and the knee was flexed to 45-50 degrees. The capsule was then closed with #2 Dyann La in a running fashion. The subcutaneous tissue was closed with, 2-0 Monocryl in interrupted inverted fashion. And the skin edges were approximated using staples. A sterile dressing was placed and an ace bandage was wrapped from toe to thigh. The tourniquet released at 86 minutes. The patient was aroused in the operating room and taken to the recovery room in stable condition. Of note all needle and sponge counts were correct in the operating room. I was present and scrubbed for the entire procedure. PLAN:    Postoperatively the patient will be weightbearing as tolerated. They will be placed on DVT prophylaxis for 2 weeks. Cultures will be followed to assess for the need for long-term antibiotics and the specific type. Patient will be on IV antibiotics until cultures come back negative. She will be assessed by physical therapy and need to be discharged home or to rehab pending her progress.   We will continue to follow the patient closely while in the hospital.    Implants:    Femoral component: Link Endo model size small with a 17 mm x 160 mm press-fit stem and 2-20 mm augments for the distal femur  Tibial component: Link Endo model size small with a 18 mm x 160 mm press-fit stem and 2-5 mm tibial augments  Patella component: Not applicable  Polyethylene: Link Endo model  Others: 30 mm titanium cone for the femur and a 45 mm titanium cone for the tibia    Abeba Peters MD  [phone] (c) [phone] (o)  11/10/2023

## 2023-11-10 NOTE — CM/SW NOTE
Department  notified of request for [facility], aidin referrals started. Assigned CM/SW to follow up with pt/family on further discharge planning.        Tom THURSTON [facility]

## 2023-11-10 NOTE — H&P
History & Physical Examination    Patient Name: Robin Franz  MRN: [id number]  [facility]: [id number]  YOB: 1955    Diagnosis: Patient is a 77year old female with a history of Right knee arthroplasty loosening. Cleared by their PMD for a revision right knee arthroplasty. There are no contraindications to proceed with surgery today. Informed consent has been obtained and all RBA have been discussed. Present Illness: Aseptic loosening right total knee arthroplasty  Medications Prior to Admission   Medication Sig Dispense Refill Last Dose    magnesium oxide 400 MG Oral Tab Take 1 tablet (400 mg total) by mouth daily. Past Month    cholecalciferol 125 MCG (5000 UT) Oral Tab Take 1 tablet (5,000 Units total) by mouth daily. 11/9/2023 at 0800    amLODIPine 5 MG Oral Tab Take 1 tablet (5 mg total) by mouth at bedtime. 11/9/2023 at 2100    Diclofenac Sodium 1 % Transdermal Gel APPLY EXTERNALLY TO THE AFFECTED AREA FOUR TIMES DAILY   11/7/2023    Multiple Vitamin Oral Tab Take 1 tablet by mouth daily. 11/9/2023 at 0800    acetaminophen 500 MG Oral Tab Take 1 tablet (500 mg total) by mouth every 6 (six) hours as needed for Pain. 11/9/2023 at 2100    Fexofenadine HCl 180 MG Oral Tab Take 1 tablet (180 mg total) by mouth as needed. 11/8/2023    lansoprazole 30 MG Oral Capsule Delayed Release Take 1 capsule (30 mg total) by mouth as needed. 11/10/2023 at 0800    famotidine 10 MG Oral Tab Take 1 tablet (10 mg total) by mouth 2 (two) times daily as needed for Heartburn. Past Month    Meloxicam 15 MG Oral Tab Take 1 tablet (15 mg total) by mouth daily.  30 tablet 0 11/2/2023       Current Facility-Administered Medications   Medication Dose Route Frequency    lactated ringers infusion   Intravenous Continuous    famotidine (Pepcid) tab 20 mg  20 mg Oral Once    Or    famotidine (Pepcid) 20 mg/2mL injection 20 mg  20 mg Intravenous Once    ceFAZolin (Ancef) 2 g in 20mL IV syringe premix  2 g Intravenous Once    vancomycin (Vancocin) 1.25 g in sodium chloride 0.9% 250mL IVPB premix  15 mg/kg Intravenous Once       Allergies: Patient has no known allergies. Past Medical History:   Diagnosis Date    Esophageal reflux     High blood pressure     Osteoarthritis     PONV (postoperative nausea and vomiting)     heart rate increased negative workup    Visual impairment     contacts     Past Surgical History:   Procedure Laterality Date    ANESTH,SURGERY OF SHOULDER Right           REPAIR ROTATOR CUFF,ACUTE Right     TOTAL KNEE REPLACEMENT      TOTAL KNEE REPLACEMENT Right 2018    DR. BRANHAM     Family History   Problem Relation Age of Onset    Hypertension Mother     Diabetes Mother     Diabetes Father      Social History     Tobacco Use    Smoking status: Never    Smokeless tobacco: Never   Substance Use Topics    Alcohol use: Not Currently         SYSTEM Check if Review is Normal Check if Physical Exam is Normal If not normal, please explain:   HEENT [ [place] [ ]    Kalina Glassing [ [place] [ ]    HEART [ [place] [ ]    Marcos Chun [ [place] [ ]    Jamar Alfredo [ [place] [ ]    Ardie Cones [ [place] [ ]    EXTREMITIES [ ] X Pain with ROM right knee , NVID, skin intact, no CT      OTHER        [ x ] I have discussed the risks and benefits and alternatives with the patient/family. They understand and agree to proceed with plan of care. [ x ] I have reviewed the History and Physical done within the last 30 days. Any changes noted above.     Eric Nielson  11/10/2023  1:35 PM  [phone]

## 2023-11-10 NOTE — DISCHARGE INSTRUCTIONS
The patient will call for an appointment in the next 2 weeks for follow-up. Patient should see us in the office 3 weeks post-op for staple removal.     The patient has been instructed on wound care and may shower if wound is clean and dry. The patient may shower and let light water run over the [address]. The prevena should be removed on POD #7 and dry dressings placed. The dry dressings should be changed PRN. If the wound has drainage then dry dressing changes should be performed daily until the wound is dry. The patient has been instructed to contact the office if increasing drainage, redness, or swelling to the operative wound/extremity occurs. Similarly, if they develop fevers and chills they should call the office ASAP. The patient will continue to wear JOSE hose to both legs for 3 weeks. They may remove them at night or if they are causing skin irritation. Prescribed DVT prophylaxis should be taken for 2-3 weeks after discharge (as written on prescription). Oral pain medications have been provided and instruction on administration given to the patient. If there are any questions or increasing or uncontrolled pain, the patient should call the office [phone]. The patient will resume a normal diet. They should anticipate a slight decrease in appetite after surgery, but should notify the office if there are any problems with nausea, vomiting, constipation or diarrhea. A stool softner has been ordered and should be taken regularly while on pain medications.     [address], [address]  Phone: [phone]  Fax: [phone]

## 2023-11-10 NOTE — ANESTHESIA PROCEDURE NOTES
Spinal Block    Date/Time: 11/10/2023 2:17 PM    Performed by: Sarah Shelley MD  Authorized by: Sarah Shelley MD      General Information and Staff    Start Time:  11/10/2023 2:17 PM  End Time:  11/10/2023 2:22 PM  Anesthesiologist:  Sarah Shelley MD  Performed by:   Anesthesiologist  Site identification: surface landmarks    Reason for Block: at surgeon's request, post-op pain management, procedure for pain and surgical anesthesia    Preanesthetic Checklist: patient identified, IV checked, risks and benefits discussed, monitors and equipment checked, pre-op evaluation, timeout performed, anesthesia consent and sterile technique used      Procedure Details    Patient Position:  Sitting  Prep: Betadine    Monitoring:  Heart rate, cardiac monitor and continuous pulse ox  Approach:  Midline  Location:  L4-5  Injection Technique:  Single-shot    Needle    Needle Type:  Pencil-tip  Needle Gauge:  24 G  Needle Length:  3.5 in    Assessment    Sensory Level:  T6  Events: clear CSF, CSF aspirated, well tolerated and blood negative      Additional Comments

## 2023-11-11 LAB
ANION GAP SERPL CALC-SCNC: 7 MMOL/L (ref 0–18)
BUN BLD-MCNC: 8 MG/DL (ref 9–23)
BUN/CREAT SERPL: 16 (ref 10–20)
CALCIUM BLD-MCNC: 8.9 MG/DL (ref 8.7–10.4)
CHLORIDE SERPL-SCNC: 101 MMOL/L (ref 98–112)
CO2 SERPL-SCNC: 28 MMOL/L (ref 21–32)
CREAT BLD-MCNC: 0.5 MG/DL
DEPRECATED RDW RBC AUTO: 43.1 FL (ref 35.1–46.3)
EGFRCR SERPLBLD CKD-EPI 2021: 101 ML/MIN/1.73M2 (ref 60–?)
ERYTHROCYTE [DISTWIDTH] IN BLOOD BY AUTOMATED COUNT: 12.7 % (ref 11–15)
GLUCOSE BLD-MCNC: 165 MG/DL (ref 70–99)
HCT VFR BLD AUTO: 38.3 %
HGB BLD-MCNC: 12.4 G/DL
MCH RBC QN AUTO: 30 PG (ref 26–34)
MCHC RBC AUTO-ENTMCNC: 32.4 G/DL (ref 31–37)
MCV RBC AUTO: 92.5 FL
OSMOLALITY SERPL CALC.SUM OF ELEC: 284 MOSM/KG (ref 275–295)
PLATELET # BLD AUTO: 353 10(3)UL (ref 150–450)
POTASSIUM SERPL-SCNC: 4.1 MMOL/L (ref 3.5–5.1)
RBC # BLD AUTO: 4.14 X10(6)UL
SODIUM SERPL-SCNC: 136 MMOL/L (ref 136–145)
WBC # BLD AUTO: 13.1 X10(3) UL (ref 4–11)

## 2023-11-11 PROCEDURE — 99233 SBSQ HOSP IP/OBS HIGH 50: CPT | Performed by: INTERNAL MEDICINE

## 2023-11-11 NOTE — CONSULTS
[facility]    PATIENT'S NAME: Jaunita Goldmann   ATTENDING PHYSICIAN: Arthur Curling, MD   CONSULTING PHYSICIAN: Ramu Gagnon MD   PATIENT ACCOUNT#:   [id number]    LOCATION:  [facility] PACU 4 [facility] 10  MEDICAL RECORD #:   [id number]       YOB: 1955  ADMISSION DATE:       11/10/2023      CONSULT DATE:  11/10/2023    REPORT OF CONSULTATION      REASON FOR CONSULTATION:  Failed right knee arthroplasty, for revision. HISTORY OF PRESENT ILLNESS:  The patient is a 70-year-old [place] American female who had revision of right knee arthroplasty done on 2020 and progressive instability and pain in her right knee. She was evaluated by Orthopedic Surgery, Dr. Arthur Curling, and scheduled today for revision procedures. Preoperatively, she had spinal block. Postoperatively, she was brought in to PACU for further monitoring. PAST MEDICAL HISTORY:  Generalized osteoarthritis, hypertension, and glaucoma. PAST SURGICAL HISTORY:  Gastroesophageal reflux disease; right rotator cuff repair; right knee total arthroplasty, first revision in ; history of . MEDICATIONS:  Please see medication reconciliation list.      ALLERGIES:  No known drug allergies. SOCIAL HISTORY:  No tobacco, alcohol, or drug use. Lives with her family. Independent for basic activities of daily living. FAMILY HISTORY:  Mother had hypertension and diabetes mellitus type 2. REVIEW OF SYSTEMS:  Currently resting in bed with no pain. No chest pain. No shortness of breath. Other 12-point review of systems is negative. PHYSICAL EXAMINATION:    GENERAL:  Alert and oriented to time, place, and person. No acute distress. VITAL SIGNS:  Temperature 97.9, pulse 99, respiratory rate 17, blood pressure 145/57, pulse ox 96% on room air. HEENT:  Atraumatic. Oropharynx clear. Dry mucous membranes. Normal hard and soft palate. Eyes:  Anicteric sclerae. NECK:  Supple. No lymphadenopathy. Trachea midline. Full range of motion. LUNGS:  Clear to auscultation bilaterally. Normal respiratory effort. HEART:  Regular rate, rhythm. S1 and S2 auscultated. No murmur. ABDOMEN:  Soft, nondistended. No tenderness. Positive bowel sounds. EXTREMITIES:  No peripheral edema, clubbing, or cyanosis. Right knee dressing. NEUROLOGIC:  Decreased sensation and muscle movement both lower extremities, expected post spinal block. Otherwise, no focal findings. ASSESSMENT AND PLAN:    1. Failed right knee arthroplasty, status post full revision arthroplasty. Spinal block. Pain control. Neurovascular checks. Eliquis for DVT prophylaxis. 2.   Essential hypertension. Continue home medications and monitor.     Dictated By Mary Sanchez MD  d: 11/10/2023 18:19:53  t: 11/10/2023 18:48:21  [facility] [id number]  FB/

## 2023-11-11 NOTE — PHYSICAL THERAPY NOTE
PHYSICAL THERAPY KNEE EVALUATION - INPATIENT       Room Number: 417/417-A  Evaluation Date: 11/11/2023  Type of Evaluation: Initial  Physician Order: PT Eval and Treat    Presenting Problem: R TKA revision  Co-Morbidities : GERD, R TKA (2015, 2020)  Reason for Therapy: Mobility Dysfunction and Discharge Planning    PHYSICAL THERAPY ASSESSMENT     Patient is a 77year old female admitted 11/10/2023 for R TKA revision with Dr. Belle Garcia. Pt to maintain WBAT R LE post operatively. RN cleared pt to participate in PT evaluation this morning. Pt received in bed at start, agreeable to participate. At baseline pt lives in one level home with spouse and son. There are 3 JANINA and she is independent at baseline. Reviewed role of therapy in acute care setting, goals of session, importance of out of bed mobility and safety precautions. Pt receptive to education. Notes feeling significant nausea since yesterday. Pt performed bed mobility supine to sit EOB with min A, difficulty progressing R LE laterally across bed. Once sitting, balance was good and pt steady. Pt continued to have nausea and complain of dizziness. BP while sitting /64 and symptoms remained consistent. Pt assisted to complete STS from EOB with min A and RW, able to take several steps over to chair and min A to lower to sitting position. Pt unable to progress farther today due to nausea and dizziness, RN is aware of session outcome and also discussed discharge recommendations. Anticipate pt will continue to progress and will be safe to return home with support of spouse and HHPT services to follow. Patient's current functional deficits include bed mobility, transfers, gait, activity tolerance, ROM/strength and endurance, which are below the patient's pre-admission status. The patient's Approx Degree of Impairment: 50.57% has been calculated based on documentation in the [facility] '6 clicks' Inpatient Basic Mobility Short Form.   Research supports that patients with this level of impairment may benefit from HHPT and 24 hr supervision. Patient will benefit from continued IP PT services to address these deficits in preparation for discharge. DISCHARGE RECOMMENDATIONS  PT Discharge Recommendations: Home with home health PT;24 hour care/supervision    PLAN  PT Treatment Plan: Bed mobility; Body mechanics; Endurance; Energy conservation;Patient education;Gait training;Range of motion;Strengthening;Neuromuscular re-educate;Stair training;Transfer training;Balance training  Rehab Potential : Good  Frequency (Obs): Daily       PHYSICAL THERAPY MEDICAL/SOCIAL HISTORY     History related to current admission:     Problem List  Principal Problem:    Failed total knee arthroplasty   Active Problems:    Essential hypertension    Pre-op testing      HOME SITUATION  Home Situation  Type of Home: House  Home Layout: One level  Stairs to Enter : 3  Railing: Yes  Lives With: Spouse  Patient Owned Equipment: None     Prior Level of [place]: indep    SUBJECTIVE  \"I just had them both revised in 2020. \"     PHYSICAL THERAPY EXAMINATION     OBJECTIVE  Precautions: Limb alert - right  Fall Risk: Standard fall risk    WEIGHT BEARING RESTRICTION        R Lower Extremity: Weight Bearing as Tolerated       PAIN ASSESSMENT             COGNITION  Overall Cognitive Status:  WFL - within functional limits    RANGE OF MOTION AND STRENGTH ASSESSMENT  Upper extremity ROM and strength are within functional limits   Lower extremity ROM is within functional limits   Lower extremity strength is within functional limits     BALANCE  Static Sitting: Fair +  Dynamic Sitting: Fair -  Static Standing: Fair -  Dynamic Standing: Fair -                                                                       ACTIVITY TOLERANCE     Heart Rate Source: Monitor     BP: 126/64  BP Location: Right arm  BP Method: Automatic  Patient Position: Sitting    O2 WALK  Oxygen Therapy  SPO2% on Room Air at Rest: 99  SPO2% on Oxygen at Rest: 100  At rest oxygen flow (liters per minute): 2  SPO2% Ambulation on Room Air: 95    AM-PAC '6-Clicks' INPATIENT SHORT FORM - BASIC MOBILITY  How much difficulty does the patient currently have. .. Patient Difficulty: Turning over in bed (including adjusting bedclothes, sheets and blankets)?: A Little   Patient Difficulty: Sitting down on and standing up from a chair with arms (e.g., wheelchair, bedside commode, etc.): A Little   Patient Difficulty: Moving from lying on back to sitting on the side of the bed?: A Little   How much help from another person does the patient currently need. .. Help from Another: Moving to and from a bed to a chair (including a wheelchair)?: A Little   Help from Another: Need to walk in hospital room?: A Little   Help from Another: Climbing 3-5 steps with a railing?: A Lot     AM-PAC Score:  Raw Score: 17   Approx Degree of Impairment: 50.57%   Standardized Score (AM-PAC Scale): 42.13   CMS Modifier (G-Code): CK    FUNCTIONAL ABILITY STATUS  Functional Mobility/Gait Assessment  Gait Assistance: Minimum assistance  Distance (ft): 3ft (stand step transfer)  Assistive Device: Rolling walker  Pattern: R Foot flat;R Decreased stance time    Bed Mobility: min A    Transfers: min A    Exercise/Education Provided:  Bed mobility  Functional activity tolerated  Gait training  Neuromuscular re-educate  ROM  Strengthening  Transfer training    Patient End of Session: Up in chair;Needs met;Call light within reach;RN aware of session/findings; All patient questions and concerns addressed    CURRENT GOALS    Goals to be met by: 11/25  Patient Goal Patient's self-stated goal is: To go home.  3   Goal #1 Patient is able to demonstrate supine - sit EOB @ level: modified independent     Goal #1   Current Status    Goal #2 Patient is able to demonstrate transfers Sit to/from Stand at assistance level: modified independent     Goal #2  Current Status    Goal #3 Patient is able to ambulate 300 feet with assistive device at assistance level: modified independent    Goal #3   Current Status    Goal #4 Patient will negotiate 3 stairs/one curb w/ assistive device and supervision   Goal #4   Current Status    Goal #5  AROM 0 degrees extension to 95 degrees flexion     Goal #5   Current Status    Goal #6 Patient independently performs home exercise program for ROM/strengthening per the instructions provided in preparation for discharge.    Goal #6  Current Status      Patient Evaluation Complexity Level:  History Low - no personal factors and/or co-morbidities   Examination of body systems Low - addressing 1-2 elements   Clinical Presentation Low - Stable   Clinical Decision Making Low Complexity     Therapeutic Activity: 15 minutes

## 2023-11-11 NOTE — PROGRESS NOTES
[facility] note: Duplicate Proton Pump Inhibitor with Histamine 2 blocker. Rafal Carney is a 77year old patient who has been prescribed both famotidine (Pepcid)  And pantoprazole (Protonix). Pepcid was discontinued per P&T approved protocol for duplicate therapy in adult patients for medications not ordered by gastroenterology.     Thank you,  Alicia Ortiz, PharmD  11/10/2023

## 2023-11-11 NOTE — PLAN OF CARE
Pt is POD #1. Pt is a&ox4 and drowsy. on 2L o2 via nc. Duramorph given for procedure. Pt has been reporting nausea and dizziness since arriving on the floor at 1900. PRN zofran and reglan given, frost still in place d/t . JOSE hose L side and SCDs SANDRA. Ace and Ice wrap on R knee. R wrist PIV LR running. General diet, hasn't ambulated yet. Call light w/in reach and using appropriately. Problem: Patient Centered Care  Goal: Patient preferences are identified and integrated in the patient's plan of care  Description: Interventions:  - What would you like us to know as we care for you? My family was here yesterday.   - Provide timely, complete, and accurate information to patient/family  - Incorporate patient and family knowledge, values, beliefs, and cultural backgrounds into the planning and delivery of care  - Encourage patient/family to participate in care and decision-making at the level they choose  - Honor patient and family perspectives and choices  Outcome: Progressing

## 2023-11-11 NOTE — PLAN OF CARE
Problem: Patient Centered Care  Goal: Patient preferences are identified and integrated in the patient's plan of care  Description: Interventions:  - What would you like us to know as we care for you? I plan to go home with home health service. - Provide timely, complete, and accurate information to patient/family  - Incorporate patient and family knowledge, values, beliefs, and cultural backgrounds into the planning and delivery of care  - Encourage patient/family to participate in care and decision-making at the level they choose  - Honor patient and family perspectives and choices  Outcome: Progressing     Problem: PAIN - ADULT  Goal: Verbalizes/displays adequate comfort level or patient's stated pain goal  Description: INTERVENTIONS:  - Encourage pt to monitor pain and request assistance  - Assess pain using appropriate pain scale  - Administer analgesics based on type and severity of pain and evaluate response  - Implement non-pharmacological measures as appropriate and evaluate response  - Consider cultural and social influences on pain and pain management  - Manage/alleviate anxiety  - Utilize distraction and/or relaxation techniques  - Monitor for opioid side effects  - Notify MD/LIP if interventions unsuccessful or patient reports new pain  - Anticipate increased pain with activity and pre-medicate as appropriate  Outcome: Progressing     Problem: RISK FOR INFECTION - ADULT  Goal: Absence of fever/infection during anticipated neutropenic period  Description: INTERVENTIONS  - Monitor WBC  - Administer growth factors as ordered  - Implement neutropenic guidelines  Outcome: Progressing     Problem: SAFETY ADULT - FALL  Goal: Free from fall injury  Description: INTERVENTIONS:  - Assess pt frequently for physical needs  - Identify cognitive and physical deficits and behaviors that affect risk of falls.   - [place] fall precautions as indicated by assessment.  - Educate pt/family on patient safety including physical limitations  - Instruct pt to call for assistance with activity based on assessment  - Modify environment to reduce risk of injury  - Provide assistive devices as appropriate  - Consider OT/PT consult to assist with strengthening/mobility  - Encourage toileting schedule  Outcome: Progressing     Problem: DISCHARGE PLANNING  Goal: Discharge to home or other facility with appropriate resources  Description: INTERVENTIONS:  - Identify barriers to discharge w/pt and caregiver  - Include patient/family/discharge partner in discharge planning  - Arrange for needed discharge resources and transportation as appropriate  - Identify discharge learning needs (meds, wound care, etc)  - Arrange for interpreters to assist at discharge as needed  - Consider post-discharge preferences of patient/family/discharge partner  - Complete POLST form as appropriate  - Assess patient's ability to be responsible for managing their own health  - Refer to Case Management Department for coordinating discharge planning if the patient needs post-hospital services based on physician/LIP order or complex needs related to functional status, cognitive ability or social support system  Outcome: Progressing   Patient is stable. Walked and voided up to the bathroom after Yusuf removed. Pain is under control with oral pain meds. Plan is for her to go home with home health.

## 2023-11-12 VITALS
BODY MASS INDEX: 35.61 KG/M2 | HEART RATE: 98 BPM | DIASTOLIC BLOOD PRESSURE: 56 MMHG | RESPIRATION RATE: 18 BRPM | SYSTOLIC BLOOD PRESSURE: 139 MMHG | OXYGEN SATURATION: 100 % | WEIGHT: 201 LBS | TEMPERATURE: 98 F | HEIGHT: 63 IN

## 2023-11-12 LAB
DEPRECATED RDW RBC AUTO: 43.8 FL (ref 35.1–46.3)
ERYTHROCYTE [DISTWIDTH] IN BLOOD BY AUTOMATED COUNT: 13.2 % (ref 11–15)
HCT VFR BLD AUTO: 35.5 %
HGB BLD-MCNC: 11.6 G/DL
MCH RBC QN AUTO: 29.8 PG (ref 26–34)
MCHC RBC AUTO-ENTMCNC: 32.7 G/DL (ref 31–37)
MCV RBC AUTO: 91.3 FL
PLATELET # BLD AUTO: 315 10(3)UL (ref 150–450)
RBC # BLD AUTO: 3.89 X10(6)UL
WBC # BLD AUTO: 12.5 X10(3) UL (ref 4–11)

## 2023-11-12 PROCEDURE — 99239 HOSP IP/OBS DSCHRG MGMT >30: CPT | Performed by: HOSPITALIST

## 2023-11-12 RX ORDER — HYDROCODONE BITARTRATE AND ACETAMINOPHEN 10; 325 MG/1; MG/1
1 TABLET ORAL EVERY 4 HOURS PRN
Status: SHIPPED | COMMUNITY
Start: 2023-11-12

## 2023-11-12 NOTE — DISCHARGE SUMMARY
[facility] - [facility]    Discharge Summary    Lisa Jeronimo Patient Status:  Inpatient    1955 MRN [id number]   Location [facility] 4W/SW/SE Attending Aga Mace MD   [address] Day # 2 PCP Oralia Taylor     Date of Admission: 11/10/2023      Date of Discharge: 23      Admitting Diagnosis: Right knee loosening  Pre-op testing    Hospital Discharge Diagnoses: Failed right knee arthroplasty    Lace+ Score: 25  59-90 High Risk  29-58 Medium Risk  0-28   Low Risk. TCM Follow-Up Recommendation:  LACE < 29: Low Risk of readmission after discharge from the hospital; Still recommend for TCM follow-up.           Problem List:   Patient Active Problem List   Diagnosis    Primary osteoarthritis of right knee    Class 1 obesity due to excess calories without serious comorbidity with body mass index (BMI) of 34.0 to 34.9 in adult    Chronic pain of both knees    It band syndrome, unspecified laterality    Greater trochanteric bursitis of both hips    Status post total bilateral knee replacement    Class 1 obesity with body mass index (BMI) of 34.0 to 34.9 in adult    Knee pain    Family history of diabetes mellitus (DM)    Other long term (current) drug therapy     Preop testing    Patellar tendinitis of both knees    Failed total knee arthroplasty     Failed total knee, right, sequela    CN (constipation)    Gastroesophageal reflux disease without esophagitis    Primary osteoarthritis of both knees    Right foot pain    Pes anserine bursitis    Essential hypertension    Pre-op testing         Physical Exam:     Gen: No acute distress  Pulm: Lungs clear, normal respiratory effort  CV: Heart with regular rate and rhythm  Abd: Abdomen soft, nontender, nondistended, bowel sounds present  Neuro: No acute focal deficits  MSK: Full range of motion in extremities, except right knee  Skin: Warm and dry  Psych: Normal affect  Ext: no c/c/e      History of Present Illness: Patient with failed right knee arthroplasty presents for surgical intervention    Hospital Course: Failed right knee arthroplasty, status post full revision arthroplasty. Spinal block. Pain control. Eliquis for DVT prophylaxis. Continue outpatient PT  Follow-up with surgery    Essential hypertension. Continue home medications and monitor. Other stable medical substrate includes: Gastroesophageal reflux disease; right rotator cuff repair; right knee total arthroplasty, first revision in ; history of . Discharge Condition: Stable    Discharge Medications:      Discharge Medications        START taking these medications        Instructions Prescription details   apixaban 2.5 MG Tabs  Commonly known as: Eliquis      Take 1 tablet (2.5 mg total) by mouth 2 (two) times daily. Refills: 0     HYDROcodone-acetaminophen  MG Tabs  Commonly known as: Norco      Take 1 tablet by mouth every 4 (four) hours as needed. Refills: 0     Sennosides 17.2 MG Tabs      Take 1 tablet (17.2 mg total) by mouth 2 (two) times daily. Refills: 0            CONTINUE taking these medications        Instructions Prescription details   amLODIPine 5 MG Tabs  Commonly known as: Norvasc      Take 1 tablet (5 mg total) by mouth at bedtime. Refills: 0     cholecalciferol 125 MCG (5000 UT) Tabs  Commonly known as: Vitamin D3      Take 1 tablet (5,000 Units total) by mouth daily. Refills: 0     famotidine 10 MG Tabs  Commonly known as: Pepcid      Take 1 tablet (10 mg total) by mouth 2 (two) times daily as needed for Heartburn. Refills: 0     fexofenadine 180 MG Tabs  Commonly known as: Allegra      Take 1 tablet (180 mg total) by mouth as needed. Refills: 0     lansoprazole 30 MG Cpdr  Commonly known as: Prevacid      Take 1 capsule (30 mg total) by mouth as needed. Refills: 0     magnesium oxide 400 MG Tabs  Commonly known as: Mag-Ox      Take 1 tablet (400 mg total) by mouth daily.    Refills: 0     Meloxicam 15 MG Tabs      Take 1 tablet (15 mg total) by mouth daily. Quantity: 30 tablet  Refills: 0     Multiple Vitamin Tabs      Take 1 tablet by mouth daily.    Refills: 0            STOP taking these medications      acetaminophen 500 MG Tabs  Commonly known as: Tylenol Extra Strength        Diclofenac Sodium 1 % Gel  Commonly known as: DANIEL Salcedo MD  11/12/2023  12:31 PM    Greater than 30 minutes spent on preparation and coordination of this discharge

## 2023-11-12 NOTE — PLAN OF CARE
POD2, Revision Right TKA. Norco given for pain. Voiding freely. Gel ice in place. Prevena drain in continuous operation. Ambulating with walker. Plan is to discharge with home health when medically cleared.     Problem: Patient Centered Care  Goal: Patient preferences are identified and integrated in the patient's plan of care  Description: Interventions:  - What would you like us to know as we care for you? - Provide timely, complete, and accurate information to patient/family  - Incorporate patient and family knowledge, values, beliefs, and cultural backgrounds into the planning and delivery of care  - Encourage patient/family to participate in care and decision-making at the level they choose  - Honor patient and family perspectives and choices  Outcome: Progressing     Problem: PAIN - ADULT  Goal: Verbalizes/displays adequate comfort level or patient's stated pain goal  Description: INTERVENTIONS:  - Encourage pt to monitor pain and request assistance  - Assess pain using appropriate pain scale  - Administer analgesics based on type and severity of pain and evaluate response  - Implement non-pharmacological measures as appropriate and evaluate response  - Consider cultural and social influences on pain and pain management  - Manage/alleviate anxiety  - Utilize distraction and/or relaxation techniques  - Monitor for opioid side effects  - Notify MD/LIP if interventions unsuccessful or patient reports new pain  - Anticipate increased pain with activity and pre-medicate as appropriate  Outcome: Progressing     Problem: RISK FOR INFECTION - ADULT  Goal: Absence of fever/infection during anticipated neutropenic period  Description: INTERVENTIONS  - Monitor WBC  - Administer growth factors as ordered  - Implement neutropenic guidelines  Outcome: Progressing     Problem: SAFETY ADULT - FALL  Goal: Free from fall injury  Description: INTERVENTIONS:  - Assess pt frequently for physical needs  - Identify cognitive and physical deficits and behaviors that affect risk of falls.   - [place] fall precautions as indicated by assessment.  - Educate pt/family on patient safety including physical limitations  - Instruct pt to call for assistance with activity based on assessment  - Modify environment to reduce risk of injury  - Provide assistive devices as appropriate  - Consider OT/PT consult to assist with strengthening/mobility  - Encourage toileting schedule  Outcome: Progressing     Problem: DISCHARGE PLANNING  Goal: Discharge to home or other facility with appropriate resources  Description: INTERVENTIONS:  - Identify barriers to discharge w/pt and caregiver  - Include patient/family/discharge partner in discharge planning  - Arrange for needed discharge resources and transportation as appropriate  - Identify discharge learning needs (meds, wound care, etc)  - Arrange for interpreters to assist at discharge as needed  - Consider post-discharge preferences of patient/family/discharge partner  - Complete POLST form as appropriate  - Assess patient's ability to be responsible for managing their own health  - Refer to Case Management Department for coordinating discharge planning if the patient needs post-hospital services based on physician/LIP order or complex needs related to functional status, cognitive ability or social support system  Outcome: Progressing

## 2023-11-12 NOTE — PHYSICAL THERAPY NOTE
PHYSICAL THERAPY TREATMENT NOTE - INPATIENT     Room Number: 417/417-A       Presenting Problem: R TKA revision    Problem List  Principal Problem:    Failed total knee arthroplasty   Active Problems:    Essential hypertension    Pre-op testing      PHYSICAL THERAPY ASSESSMENT   Chart reviewed. RN Jamaica Bruno approved participation in physical therapy. PPE worn by therapist: mask and gloves. Patient was not wearing a mask during session. Patient presented in bedside chair with did not rate pain. Patient with good  progress towards goals during this session. Education provided on Total knee exercise protocol, Physical therapy plan of care, physiological benefits of out of bed mobility, and stair negotiation . Patient with good carryover. Pt is received in the chair and was cleared for therapy session. Pt was SBA with all sit<>stand transfers with the RW. Pt was able to AMB about 61' with the RW SBA. Pt with decreased pino and step length with narrow RAMILA but with very good balance and safety awareness. Pt was brought to the therapy gym for stair training. Pt was educated and able to negotiate 4 stairs with 1 HR and HHA CGA for balance and safety. Pt is cued for proper technique and sequencing. Pt with good balance on the stairs. Returned pt back to the room and to sitting in the chair with all needs within reach. Discussed with pt on her dc recommendations for home. Pt is on track to dc to home once medically cleared. Reported to the RN on the status of the pt. Bed mobility:  NT  Transfers: Supervision  Gait Assistance: Supervision  Distance (ft): 60'  Assistive Device: Rolling walker  Pattern: R Decreased stance time          . Patient was left in bedside chair at end of session with all needs in reach. The patient's Approx Degree of Impairment: 46.58% has been calculated based on documentation in the [facility] '6 clicks' Inpatient Basic Mobility Short Form.   Research supports that patients with this level of impairment may benefit from Home with home health PT. RN aware of patient status post session. DISCHARGE RECOMMENDATIONS  PT Discharge Recommendations: Home with home health PT;24 hour care/supervision     PLAN  PT Treatment Plan: Bed mobility; Body mechanics; Coordination; Endurance; Patient education;Gait training;Range of motion;Strengthening;Stoop training;Stair training;Transfer training;Balance training    SUBJECTIVE  Pt was agreeable to therapy session. OBJECTIVE  Precautions: Limb alert - right    WEIGHT BEARING RESTRICTION  Weight Bearing Restriction: R lower extremity        R Lower Extremity: Weight Bearing as Tolerated       PAIN ASSESSMENT   Rating: Unable to rate     Management Techniques: Activity promotion; Body mechanics; Relaxation;Repositioning    BALANCE                                                                                                                       Static Sitting: Good  Dynamic Sitting: Fair +           Static Standing: Fair  Dynamic Standing: Fair -    ACTIVITY TOLERANCE                         O2 WALK       AM-PAC '6-Clicks' INPATIENT SHORT FORM - BASIC MOBILITY  How much difficulty does the patient currently have. .. Patient Difficulty: Turning over in bed (including adjusting bedclothes, sheets and blankets)?: A Little   Patient Difficulty: Sitting down on and standing up from a chair with arms (e.g., wheelchair, bedside commode, etc.): A Little   Patient Difficulty: Moving from lying on back to sitting on the side of the bed?: A Little   How much help from another person does the patient currently need. ..    Help from Another: Moving to and from a bed to a chair (including a wheelchair)?: A Little   Help from Another: Need to walk in hospital room?: A Little   Help from Another: Climbing 3-5 steps with a railing?: A Little     AM-PAC Score:  Raw Score: 18   Approx Degree of Impairment: 46.58%   Standardized Score (AM-PAC Scale): 43.63   CMS Modifier (G-Code): CK          Patient End of Session: Up in chair;Needs met;Call light within reach;RN aware of session/findings; All patient questions and concerns addressed    CURRENT GOALS   Goals to be met by: 11/25  Patient Goal Patient's self-stated goal is: To go home. 3   Goal #1 Patient is able to demonstrate supine - sit EOB @ level: modified independent     Goal #1   Current Status NT received in the bed   Goal #2 Patient is able to demonstrate transfers Sit to/from Stand at assistance level: modified independent     Goal #2  Current Status SBA with the RW   Goal #3 Patient is able to ambulate 300 feet with assistive device at assistance level: modified independent    Goal #3   Current Status 61' with the RW SBA   Goal #4 Patient will negotiate 3 stairs/one curb w/ assistive device and supervision   Goal #4   Current Status 4 stairs with 1 HR CGA and HHA   Goal #5  AROM 0 degrees extension to 95 degrees flexion     Goal #5   Current Status IN PROGRESS   Goal #6 Patient independently performs home exercise program for ROM/strengthening per the instructions provided in preparation for discharge.    Goal #6  Current Status IN PROGRESS           Therapeutic Activity: 30 minutes

## 2023-11-12 NOTE — CM/SW NOTE
CM received message via [employer]  from [facility], Pt has been referred to [facility] prior to surgery by Dr. Olvin Moore. F2F entered. [facility]  res via [employer] and notified of dc today. Plan: Home w/ [facility] today. / to remain available for support and/or discharge planning. Orin Monte.  Muna Garcia RN, BSN  Nurse   [phone]

## 2024-10-26 ENCOUNTER — HOSPITAL ENCOUNTER (EMERGENCY)
Facility: HOSPITAL | Age: 69
Discharge: HOME | End: 2024-10-26
Attending: EMERGENCY MEDICINE
Payer: MEDICARE

## 2024-10-26 ENCOUNTER — APPOINTMENT (OUTPATIENT)
Dept: GENERAL RADIOLOGY | Facility: HOSPITAL | Age: 69
End: 2024-10-26
Attending: EMERGENCY MEDICINE
Payer: MEDICARE

## 2024-10-26 VITALS
HEIGHT: 63 IN | SYSTOLIC BLOOD PRESSURE: 144 MMHG | WEIGHT: 185 LBS | RESPIRATION RATE: 12 BRPM | OXYGEN SATURATION: 100 % | TEMPERATURE: 98 F | DIASTOLIC BLOOD PRESSURE: 55 MMHG | HEART RATE: 99 BPM | BODY MASS INDEX: 32.78 KG/M2

## 2024-10-26 DIAGNOSIS — R21 RASH: ICD-10-CM

## 2024-10-26 DIAGNOSIS — R07.9 CHEST PAIN, UNSPECIFIED TYPE: Primary | ICD-10-CM

## 2024-10-26 LAB
ALBUMIN SERPL-MCNC: 4.8 G/DL (ref 3.2–4.8)
ALBUMIN/GLOB SERPL: 1.7 {RATIO} (ref 1–2)
ALP LIVER SERPL-CCNC: 144 U/L
ALT SERPL-CCNC: 21 U/L
ANION GAP SERPL CALC-SCNC: 8 MMOL/L (ref 0–18)
AST SERPL-CCNC: 33 U/L (ref ?–34)
ATRIAL RATE: 106 BPM
BASOPHILS # BLD AUTO: 0.04 X10(3) UL (ref 0–0.2)
BASOPHILS NFR BLD AUTO: 0.4 %
BILIRUB SERPL-MCNC: 0.4 MG/DL (ref 0.2–1.1)
BUN BLD-MCNC: 16 MG/DL (ref 9–23)
BUN/CREAT SERPL: 25 (ref 10–20)
CALCIUM BLD-MCNC: 10 MG/DL (ref 8.7–10.4)
CHLORIDE SERPL-SCNC: 103 MMOL/L (ref 98–112)
CO2 SERPL-SCNC: 28 MMOL/L (ref 21–32)
CREAT BLD-MCNC: 0.64 MG/DL
DEPRECATED RDW RBC AUTO: 43.8 FL (ref 35.1–46.3)
EGFRCR SERPLBLD CKD-EPI 2021: 94 ML/MIN/1.73M2 (ref 60–?)
EOSINOPHIL # BLD AUTO: 0.1 X10(3) UL (ref 0–0.7)
EOSINOPHIL NFR BLD AUTO: 0.9 %
ERYTHROCYTE [DISTWIDTH] IN BLOOD BY AUTOMATED COUNT: 13.2 % (ref 11–15)
GLOBULIN PLAS-MCNC: 2.9 G/DL (ref 2–3.5)
GLUCOSE BLD-MCNC: 116 MG/DL (ref 70–99)
HCT VFR BLD AUTO: 43.1 %
HGB BLD-MCNC: 14.4 G/DL
IMM GRANULOCYTES # BLD AUTO: 0.05 X10(3) UL (ref 0–1)
IMM GRANULOCYTES NFR BLD: 0.4 %
LIPASE SERPL-CCNC: 40 U/L (ref 12–53)
LYMPHOCYTES # BLD AUTO: 2.45 X10(3) UL (ref 1–4)
LYMPHOCYTES NFR BLD AUTO: 21.5 %
MCH RBC QN AUTO: 30 PG (ref 26–34)
MCHC RBC AUTO-ENTMCNC: 33.4 G/DL (ref 31–37)
MCV RBC AUTO: 89.8 FL
MONOCYTES # BLD AUTO: 0.66 X10(3) UL (ref 0.1–1)
MONOCYTES NFR BLD AUTO: 5.8 %
NEUTROPHILS # BLD AUTO: 8.09 X10 (3) UL (ref 1.5–7.7)
NEUTROPHILS # BLD AUTO: 8.09 X10(3) UL (ref 1.5–7.7)
NEUTROPHILS NFR BLD AUTO: 71 %
OSMOLALITY SERPL CALC.SUM OF ELEC: 290 MOSM/KG (ref 275–295)
P AXIS: 69 DEGREES
P-R INTERVAL: 124 MS
PLATELET # BLD AUTO: 414 10(3)UL (ref 150–450)
POTASSIUM SERPL-SCNC: 4.3 MMOL/L (ref 3.5–5.1)
PROT SERPL-MCNC: 7.7 G/DL (ref 5.7–8.2)
Q-T INTERVAL: 386 MS
QRS DURATION: 150 MS
QTC CALCULATION (BEZET): 512 MS
R AXIS: -21 DEGREES
RBC # BLD AUTO: 4.8 X10(6)UL
SODIUM SERPL-SCNC: 139 MMOL/L (ref 136–145)
T AXIS: 61 DEGREES
TROPONIN I SERPL HS-MCNC: 4 NG/L
VENTRICULAR RATE: 106 BPM
WBC # BLD AUTO: 11.4 X10(3) UL (ref 4–11)

## 2024-10-26 PROCEDURE — 83690 ASSAY OF LIPASE: CPT | Performed by: EMERGENCY MEDICINE

## 2024-10-26 PROCEDURE — 80053 COMPREHEN METABOLIC PANEL: CPT | Performed by: EMERGENCY MEDICINE

## 2024-10-26 PROCEDURE — 93005 ELECTROCARDIOGRAM TRACING: CPT

## 2024-10-26 PROCEDURE — 99285 EMERGENCY DEPT VISIT HI MDM: CPT

## 2024-10-26 PROCEDURE — 84484 ASSAY OF TROPONIN QUANT: CPT | Performed by: EMERGENCY MEDICINE

## 2024-10-26 PROCEDURE — 85025 COMPLETE CBC W/AUTO DIFF WBC: CPT | Performed by: EMERGENCY MEDICINE

## 2024-10-26 PROCEDURE — 96374 THER/PROPH/DIAG INJ IV PUSH: CPT

## 2024-10-26 PROCEDURE — 99284 EMERGENCY DEPT VISIT MOD MDM: CPT

## 2024-10-26 PROCEDURE — 93010 ELECTROCARDIOGRAM REPORT: CPT

## 2024-10-26 PROCEDURE — 71045 X-RAY EXAM CHEST 1 VIEW: CPT | Performed by: EMERGENCY MEDICINE

## 2024-10-26 RX ORDER — DIPHENHYDRAMINE HYDROCHLORIDE 50 MG/ML
25 INJECTION INTRAMUSCULAR; INTRAVENOUS ONCE
Status: COMPLETED | OUTPATIENT
Start: 2024-10-26 | End: 2024-10-26

## 2024-10-26 RX ORDER — LIDOCAINE HYDROCHLORIDE 20 MG/ML
10 SOLUTION OROPHARYNGEAL ONCE
Status: COMPLETED | OUTPATIENT
Start: 2024-10-26 | End: 2024-10-26

## 2024-10-26 RX ORDER — MAGNESIUM HYDROXIDE/ALUMINUM HYDROXICE/SIMETHICONE 120; 1200; 1200 MG/30ML; MG/30ML; MG/30ML
30 SUSPENSION ORAL ONCE
Status: COMPLETED | OUTPATIENT
Start: 2024-10-26 | End: 2024-10-26

## 2024-10-26 NOTE — ED INITIAL ASSESSMENT (HPI)
Pt presents with c/o indigestion after eating a pasta dish with parmesan chicken on top.  Pt tried Tums and Pepcid without any relief.  Pt states that she woke-up and was having itching on her chest and she put on some hydrocortizone cream.

## 2024-10-26 NOTE — ED PROVIDER NOTES
Patient Seen in: [facility] Emergency Department      History     Chief Complaint   Patient presents with    Allergic Rxn Allergies    Chest Pain Angina     Stated Complaint: GI Issues + Hives    Subjective:   HPI      69-year-old female with history of hypertension and esophageal reflux presents with complaints of heartburn.  The patient reports a burning and pressure sensation from the midline of her lower chest radiating upwards.  She reports onset of symptoms around 8:30 PM last evening.  The symptoms began approximately an hour after eating a chicken Parmesan dish with pasta.  She reports taking Tums along with famotidine but states that the symptoms persisted.  Overnight she noted a itchy rash to her upper chest and neck.  She applied topical hydrocortisone cream to the rash and states that it seems to have resolved.  She reports persistent but improved symptoms of heartburn.  She denies any nausea or vomiting.  She denies cough or dyspnea.  No fevers.    Objective:     Past Medical History:    Esophageal reflux    High blood pressure    Osteoarthritis    PONV (postoperative nausea and vomiting)    heart rate increased negative workup    Visual impairment    contacts              Past Surgical History:   Procedure Laterality Date    Anesth,surgery of shoulder Right           Repair rotator cuff,acute Right     Total knee replacement      Total knee replacement Right 2018    DR. BRANHAM                Social History     Socioeconomic History    Marital status:    Tobacco Use    Smoking status: Never    Smokeless tobacco: Never   Vaping Use    Vaping status: Never Used   Substance and Sexual Activity    Alcohol use: Not Currently    Drug use: No     Social Drivers of Health     Financial Resource Strain: Low Risk  (2024)    Received from [facility]    Overall Financial Resource Strain (CARDIA)     Difficulty of Paying Living Expenses: Not hard at all    Food Insecurity: No Food Insecurity (4/22/2024)    Received from [facility]    Hunger Vital Sign     Worried About Running Out of Food in the Last Year: Never true     Ran Out of Food in the Last Year: Never true   Transportation Needs: No Transportation Needs (4/22/2024)    Received from [facility]    PRAPARE - Transportation     Lack of Transportation (Medical): No     Lack of Transportation (Non-Medical): No   Physical Activity: Sufficiently Active (4/23/2024)    Received from [facility]    Exercise Vital Sign     Days of Exercise per Week: 3 days     Minutes of Exercise per Session: 90 min   Stress: No Stress Concern Present (4/23/2024)    Received from [facility]    Citizen of [place] of Occupational Health - Occupational Stress Questionnaire     Feeling of Stress : Not at all   Social Connections: Socially Integrated (11/22/2022)    Received from [facility], [facility]    Social Connection and Isolation Panel [NHANES]     Frequency of Communication with Friends and Family: More than three times a week     Frequency of Social Gatherings with Friends and Family: More than three times a week     Attends Islam Services: More than 4 times per year     Active Member of Clubs or Organizations: Yes     Attends Club or Organization Meetings: More than 4 times per year     Marital Status:    Housing Stability: Low Risk  (4/22/2024)    Received from [facility]    Housing Stability Vital Sign     Unable to Pay for Housing in the Last Year: No     Number of Places Lived in the Last Year: 1     Unstable Housing in the Last Year: No                  Physical Exam     ED Triage Vitals [10/26/24 0555]   /85   Pulse 101   Resp 20   Temp 98 °F (36.7 °C)   Temp src Oral   SpO2 98 %   O2 Device None (Room air)       Current Vitals:   Vital Signs  BP:  144/55  Pulse: 99  Resp: 12  Temp: 98 °F (36.7 °C)  Temp src: Oral  MAP (mmHg): 78    Oxygen Therapy  SpO2: 100 %  O2 Device: None (Room air)        Physical Exam    General Appearance: alert, no distress  Eyes: pupils equal and round no pallor or injection  ENT, Mouth: mucous membranes moist  Respiratory: there are no retractions, lungs are clear to auscultation  Cardiovascular: regular rate and rhythm  Gastrointestinal:  abdomen is soft and non tender, no masses, bowel sounds normal  Neurological: Speech normal.  Moving extremities x 4.  Skin: warm and dry, no rashes.  Musculoskeletal: neck is supple non tender        Extremities are symmetrical, full range of motion no leg edema or tenderness noted.  Psychiatric: patient is oriented X 3, there is no agitation    DIFFERENTIAL DIAGNOSIS: After history and physical exam differential diagnosis was considered for gastritis, reflux, cardiac ischemia, allergic reaction, or other        ED Course     Labs Reviewed   COMP METABOLIC PANEL (14) - Abnormal; Notable for the following components:       Result Value    Glucose 116 (*)     BUN/CREA Ratio 25.0 (*)     Alkaline Phosphatase 144 (*)     All other components within normal limits   CBC WITH DIFFERENTIAL WITH PLATELET - Abnormal; Notable for the following components:    WBC 11.4 (*)     Neutrophil Absolute Prelim 8.09 (*)     Neutrophil Absolute 8.09 (*)     All other components within normal limits   TROPONIN I HIGH SENSITIVITY - Normal   LIPASE - Normal     EKG    Rate, intervals and axes as noted on EKG Report.  Rate: 106  Rhythm: Sinus Rhythm  Axis: Normal  Reading: Right bundle branch block new as compared to EKG from 7/10/2020                     [facility]      Patient reports resolution of chest discomfort/heartburn after receiving GI cocktail.  Chest x-ray was reviewed independently by me.  No pneumonia or pneumothorax noted.  Lab results noted.  Patient comfortable appearing at present.  She does report recurrence  of a small area of rash to her chest.  On reexamination the patient is a fine, urticarial appearing rash noted to the center of her chest.  No other rash noted.  No vesicles or pustules noted.  Will give a dose of Benadryl prior to discharge home.  She is advised to continue Pepcid and instructed that she may also take Maalox as needed if heartburn symptoms recur.  She is advised to follow-up with her primary physician for reevaluation.  She is advised to return if increased pain, vomiting, or other new symptoms develop.        Medical Decision Making      Disposition and Plan     Clinical Impression:  1. Chest pain, unspecified type    2. Rash         Disposition:  Discharge  10/26/2024  8:40 am    Follow-up:  own physician    Follow up      We recommend that you schedule follow up care with a primary care provider within the next three months to obtain basic health screening including reassessment of your blood pressure.      Medications Prescribed:  Current Discharge Medication List              Supplementary Documentation:

## 2024-10-26 NOTE — DISCHARGE INSTRUCTIONS
Continue famotidine (Pepcid) as before.  You may also take over-the-counter Maalox as needed if heartburn symptoms recur.  Take Benadryl up to every 4 hours as needed for rash or itching.  Return to the emergency department if increased chest pain, difficulty breathing, repeated vomiting, or other new symptoms develop.

## (undated) DEVICE — DRESSING 10X4IN ANMC SAFETAC

## (undated) DEVICE — DRAPE SHEET LG

## (undated) DEVICE — PAD THRP 16IN WRPON MU LNG STM

## (undated) DEVICE — BATTERY

## (undated) DEVICE — SPONGE LAP 18X18 XRAY STRL

## (undated) DEVICE — 3M™ TEGADERM™ TRANSPARENT FILM DRESSING, 1626W, 4 IN X 4-3/4 IN (10 CM X 12 CM), 50 EACH/CARTON, 4 CARTON/CASE: Brand: 3M™ TEGADERM™

## (undated) DEVICE — TOURNIQUET CUFF 34 STR

## (undated) DEVICE — Device

## (undated) DEVICE — SOLUTION IRRIG 1000ML 0.9% NACL USP BTL

## (undated) DEVICE — COVER TABLE BACK PADDED HVY DU

## (undated) DEVICE — CONTAINER SPEC STR 4OZ GRY LID

## (undated) DEVICE — GOWN SURG AERO BLUE PERF XLG

## (undated) DEVICE — SOLUTION PREP 26ML 0.7% POVACRYLEX 74% ISO

## (undated) DEVICE — SOL  .9 3000ML

## (undated) DEVICE — VIOLET BRAIDED (POLYGLACTIN 910), SYNTHETIC ABSORBABLE SUTURE: Brand: COATED VICRYL

## (undated) DEVICE — POLAR CARE CUBE COOLING UNIT

## (undated) DEVICE — TOTAL KNEE: Brand: MEDLINE INDUSTRIES, INC.

## (undated) DEVICE — NEEDLE HYPO 18GA L1.5IN PNK HUB PVT SHLD BVL

## (undated) DEVICE — SKIN PREP TRAY 4 COMPARTM TRAY: Brand: MEDLINE INDUSTRIES, INC.

## (undated) DEVICE — BANDAGE COHESIVE W4INXL5YD TAN E POR SLF ADH

## (undated) DEVICE — SOL  .9 1000ML BTL

## (undated) DEVICE — GAMMEX® NON-LATEX PI ORTHO SIZE 9, STERILE POLYISOPRENE POWDER-FREE SURGICAL GLOVE: Brand: GAMMEX

## (undated) DEVICE — HIGH CAPACITY INTRAMEDULLARY BRUSH TIP: Brand: PULSAVAC®

## (undated) DEVICE — TRAY SRGPRP PVP IOD WT SCRB SM

## (undated) DEVICE — COTTON ROLL: Brand: DEROYAL

## (undated) DEVICE — 2T11 #2 PDO 36 X 36: Brand: 2T11 #2 PDO 36 X 36

## (undated) DEVICE — ZIMMER PATIENT SPECIFIC INSTRUMENTS

## (undated) DEVICE — BANDAGE ROLL,100% COTTON, 6 PLY, LARGE: Brand: KERLIX

## (undated) DEVICE — SHEET,DRAPE,70X100,STERILE: Brand: MEDLINE

## (undated) DEVICE — 3M™ STERI-STRIP™ REINFORCED ADHESIVE SKIN CLOSURES, R1547, 1/2 IN X 4 IN (12 MM X 100 MM), 6 STRIPS/ENVELOPE: Brand: 3M™ STERI-STRIP™

## (undated) DEVICE — HOOD: Brand: FLYTE

## (undated) DEVICE — GAMMEX® PI HYBRID SIZE 8.5, STERILE POWDER-FREE SURGICAL GLOVE, POLYISOPRENE AND NEOPRENE BLEND: Brand: GAMMEX

## (undated) DEVICE — BOWL BNE CEM MIX DISP QUIK-VAC

## (undated) DEVICE — CEMENT MIXING SYSTEM WITH FEMORAL BREAKWAY NOZZLE: Brand: REVOLUTION

## (undated) DEVICE — ADHESIVE MASTISOL 2/3CC VL

## (undated) DEVICE — Device: Brand: STABLECUT®

## (undated) DEVICE — 2MM SET SCREW HEX DRIVER

## (undated) DEVICE — 2DE14 2-0 PDO 24 X 24: Brand: 2DE14 2-0 PDO 24 X 24

## (undated) DEVICE — DISPOSABLE DRILL/PIN SET

## (undated) DEVICE — T5 HOOD WITH PEEL AWAY FACE SHIELD

## (undated) DEVICE — GOWN SURG AERO BLUE PERF LG

## (undated) DEVICE — CULTURE TUBE ANAEROBIC

## (undated) DEVICE — NEX KNEE REPLACEMENT EXT SCREW: Type: IMPLANTABLE DEVICE | Site: KNEE

## (undated) DEVICE — CONTAINER,SPECIMEN,OR STERILE,4OZ: Brand: MEDLINE

## (undated) DEVICE — DRESSING BORDER AQUACEL 4X10

## (undated) DEVICE — GAMMEX® PI HYBRID SIZE 9, STERILE POWDER-FREE SURGICAL GLOVE, POLYISOPRENE AND NEOPRENE BLEND: Brand: GAMMEX

## (undated) DEVICE — GAMMEX® PI HYBRID SIZE 7, STERILE POWDER-FREE SURGICAL GLOVE, POLYISOPRENE AND NEOPRENE BLEND: Brand: GAMMEX

## (undated) DEVICE — PROXIMATE SKIN STAPLERS (35 WIDE) CONTAINS 35 STAINLESS STEEL STAPLES (FIXED HEAD): Brand: PROXIMATE

## (undated) DEVICE — 2DE14 2-0 PDO 14X14: Brand: 2DE14 2-0 PDO 14X14

## (undated) DEVICE — DERMABOND LIQUID ADHESIVE

## (undated) DEVICE — CULTURE COLLECT/TRANSPORT SYS

## (undated) DEVICE — SUTURE MCRYL SZ 2-0 L36IN ABSRB UD L36MM CT-1

## (undated) DEVICE — BANDAGE FLXMSTR 11YDX6IN STRL

## (undated) DEVICE — 60 ML SYRINGE CATHETER TIP: Brand: MONOJECT

## (undated) DEVICE — IMPERVIOUS STOCKINETTE: Brand: DEROYAL

## (undated) DEVICE — 6.0MM ACORN

## (undated) DEVICE — 20 ML SYRINGE LUER-LOCK TIP: Brand: MONOJECT

## (undated) DEVICE — SPONGE GZ 4XL4IN 100% COT 12 PLY TYP VII WVN

## (undated) DEVICE — ESMARK: Brand: DEROYAL

## (undated) DEVICE — NEEDLE SPINAL 22X3-1/2 BLK

## (undated) DEVICE — 50MM MG 2.5 HEX HEAD PIN-DJ

## (undated) DEVICE — ZIMMER® STERILE DISPOSABLE TOURNIQUET CUFF WITH PLC, DUAL PORT, SINGLE BLADDER, 34 IN. (86 CM)

## (undated) DEVICE — TRAY FOLEY BDX 16F STATLOCK

## (undated) DEVICE — BLADE SW .5IN MCHC 2.75IN

## (undated) DEVICE — STERILE LATEX POWDER-FREE SURGICAL GLOVES WITH HYDROGEL COATING, SMOOTH FINISH, STRAIGHT FINGER: Brand: PROTEXIS

## (undated) DEVICE — 2T9 -0- UNDYED MONODERM 36X36: Brand: 2T9 -0- UNDYED MONODERM 36X36

## (undated) DEVICE — DUAL CUT SAGITTAL BLADE

## (undated) DEVICE — SYRINGE MNJCT 35ML LF STRL LL

## (undated) DEVICE — SUTURE VICRYL 2-0 FS-1

## (undated) DEVICE — STERILE LATEX POWDER-FREE SURGICAL GLOVESWITH NITRILE COATING: Brand: PROTEXIS

## (undated) DEVICE — IOT 48MM PIN

## (undated) DEVICE — TRAY CATH 16FR F INCLUDE BARDX IC COMPLT CARE

## (undated) DEVICE — 25MM FEMALE SCREW-Z

## (undated) DEVICE — 3M™ IOBAN™ 2 ANTIMICROBIAL INCISE DRAPE 6650EZ: Brand: IOBAN™ 2

## (undated) DEVICE — 33MM MG 2.5 HEX HEAD PIN-DJ

## (undated) DEVICE — CHLORAPREP 26ML APPLICATOR

## (undated) DEVICE — SUTURE PDS II 1 CT-1

## (undated) DEVICE — WRAP COOLING KNEE W/ICE PILLOW

## (undated) DEVICE — GAMMEX® NON-LATEX PI ORTHO SIZE 8.5, STERILE POLYISOPRENE POWDER-FREE SURGICAL GLOVE: Brand: GAMMEX

## (undated) DEVICE — NEEDLE SPINAL 18X3-1/2 PINK.

## (undated) DEVICE — BLADE SAW SAGITTAL 19.5

## (undated) DEVICE — 48MM HEADED SCREW-Z

## (undated) DEVICE — STANDARD HYPODERMIC NEEDLE,POLYPROPYLENE HUB: Brand: MONOJECT

## (undated) DEVICE — ENCORE® LATEX MICRO SIZE 8.5, STERILE LATEX POWDER-FREE SURGICAL GLOVE: Brand: ENCORE

## (undated) DEVICE — 2.3MM X 19.0MM TAPERED ROUTER

## (undated) DEVICE — WRENCH ZIM BREAKAWAY 5987-71

## (undated) DEVICE — KIT FEM BONE CEMENT RESTRICTOR: Type: IMPLANTABLE DEVICE | Site: KNEE

## (undated) DEVICE — KIT SPEC COLL 1ML WHT POLYPR TB W/ LIQ AMIES

## (undated) DEVICE — GAUZE SPONGES,12 PLY: Brand: CURITY

## (undated) DEVICE — Device: Brand: POWER-FLO®

## (undated) DEVICE — PAD N ADH W3XL4IN POLY COT SFT PERF FLM ABSRB

## (undated) DEVICE — SOLUTION IRRIG 3000ML 0.9% NACL FLX CONT

## (undated) DEVICE — TOURNIQUET CUFF 42 STR

## (undated) NOTE — LETTER
AUTHORIZATION FOR SURGICAL OPERATION OR OTHER PROCEDURE    1. I hereby authorize Dr. Shira Riggs and the [facility] Office staff assigned to my case to perform the following operation and/or procedure at the [facility] Office:    BILATERAL knee hydrodisection     2.   My []  Parent    Responsible person                          []  Spouse  In case of minor or                    [] Other  _____________   Incompetent name:  __________________________________________________                               (please prin

## (undated) NOTE — LETTER
AUTHORIZATION FOR SURGICAL OPERATION OR OTHER PROCEDURE    1. I hereby authorize Dr. Shante Sharma and the [facility] Office staff assigned to my case to perform the following operation and/or procedure at the [facility] Office:    Right pes anserine bursa CSI    2. My physician has explained the nature and purpose of the operation or other procedure, possible alternative methods of treatment, the risks involved, and the possibility of complication to me. I acknowledge that no guarantee has been made as to the result that may be obtained. 3.  I recognize that, during the course of this operation, or other procedure, unforseen conditions may necessitate additional or different procedure than those listed above. I, therefore, further authorize and request that the above named physician, his/her physician assistants or designees perform such procedures as are, in his/her professional opinion, necessary and desirable. 4.  Any tissue or organs removed in the operation or other procedure may be disposed of by and at the discretion of the [facility] Office staff and [facility] AT [facility]. 5.  I understand that in the event of a medical emergency, I will be transported by local paramedics to [facility] or other hospital emergency department. 6.  I certify that I have read and fully understand the above consent to operation and/or other procedure. 7.  I acknowledge that my physician has explained sedation/analgesia administration to me including the risks and benefits. I consent to the administration of sedation/analgesia as may be necessary or desirable in the judgement of my physician. Witness signature: ___________________________________________________ Date:  ______/______/_____                    Time:  ________ A. M.  P.M.        Patient Name: Lisa Mckeon [facility]  2/20/1955  [id number]         Patient signature:  ___________________________________________________                Statement of Physician  My signature below affirms that prior to the time of the procedure, I have explained to the patient and/or his/her guardian, the risks and benefits involved in the proposed treatment and any reasonable alternative to the proposed treatment. I have also explained the risks and benefits involved in the refusal of the proposed treatment and have answered the patient's questions.                         Date:  ______/______/_______  Provider                      Signature:  __________________________________________________________       Time:  ___________ A.M    P.M.

## (undated) NOTE — LETTER
AUTHORIZATION FOR SURGICAL OPERATION OR OTHER PROCEDURE    1.  I hereby authorize Dr. Eileen Dumont  and the [facility] Office staff assigned to my case to perform the following operation and/or procedure at the [facility] Office:    BILATERAL knee genicular nerve block Relationship to Patient:           []  Parent    Responsible person                          []  Spouse  In case of minor or                    [] Other  _____________   Incompetent name:  __________________________________________________

## (undated) NOTE — LETTER
CHASE. Notifier: Enrique/[employer]   ROSALEE. Patient Name: Ember Choe. Identification Number: [id number]      Advance Beneficiary Notice of Noncoverage (ABN)  NOTE:  If Medicare doesn’t pay for D. BILATERAL knee genicular nerve block below, you I made to you, less co-pays or deductibles. ? OPTION 2. I want the D. BILATERAL knee genicular nerve block listed above, but do not bill Medicare. You may ask to be paid now as I am responsible for payment. I cannot appeal if Medicare is not billed. ?  O

## (undated) NOTE — LETTER
AUTHORIZATION FOR SURGICAL OPERATION OR OTHER PROCEDURE    1. I hereby authorize Dr. Janice Clark and the [facility] Office staff assigned to my case to perform the following operation and/or procedure at the [facility] Office:    RIGHT pes anserine bursa CSI under ultrasound guidance    2. My physician has explained the nature and purpose of the operation or other procedure, possible alternative methods of treatment, the risks involved, and the possibility of complication to me. I acknowledge that no guarantee has been made as to the result that may be obtained. 3.  I recognize that, during the course of this operation, or other procedure, unforseen conditions may necessitate additional or different procedure than those listed above. I, therefore, further authorize and request that the above named physician, his/her physician assistants or designees perform such procedures as are, in his/her professional opinion, necessary and desirable. 4.  Any tissue or organs removed in the operation or other procedure may be disposed of by and at the discretion of the [facility] Office staff and [facility] AT [facility]. 5.  I understand that in the event of a medical emergency, I will be transported by local paramedics to [facility] or other [facility] emergency department. 6.  I certify that I have read and fully understand the above consent to operation and/or other procedure. 7.  I acknowledge that my physician has explained sedation/analgesia administration to me including the risks and benefits. I consent to the administration of sedation/analgesia as may be necessary or desirable in the judgement of my physician. Witness signature: ___________________________________________________ Date:  ______/______/_____                    Time:  ________ A. M.  P.M.        Patient Name:  Jose Soni [facility]  2/20/1955  [id number]         Patient signature: ___________________________________________________               Statement of Physician  My signature below affirms that prior to the time of the procedure, I have explained to the patient and/or his/her guardian, the risks and benefits involved in the proposed treatment and any reasonable alternative to the proposed treatment. I have also explained the risks and benefits involved in the refusal of the proposed treatment and have answered the patient's questions.                         Date:  ______/______/_______  Provider                      Signature:  __________________________________________________________       Time:  ___________ AJESSE MARQUEZ

## (undated) NOTE — LETTER
AUTHORIZATION FOR SURGICAL OPERATION OR OTHER PROCEDURE    1. I hereby authorize Dr. Paulie Aaron and the [facility] Office staff assigned to my case to perform the following operation and/or procedure at the [facility] Office:    RIGHT pes anserine bursa CSI under ultrasound guidance     2. My physician has explained the nature and purpose of the operation or other procedure, possible alternative methods of treatment, the risks involved, and the possibility of complication to me. I acknowledge that no guarantee has been made as to the result that may be obtained. 3.  I recognize that, during the course of this operation, or other procedure, unforseen conditions may necessitate additional or different procedure than those listed above. I, therefore, further authorize and request that the above named physician, his/her physician assistants or designees perform such procedures as are, in his/her professional opinion, necessary and desirable. 4.  Any tissue or organs removed in the operation or other procedure may be disposed of by and at the discretion of the [facility] Office staff and [facility] AT [facility]. 5.  I understand that in the event of a medical emergency, I will be transported by local paramedics to [facility] or other hospital emergency department. 6.  I certify that I have read and fully understand the above consent to operation and/or other procedure. 7.  I acknowledge that my physician has explained sedation/analgesia administration to me including the risks and benefits. I consent to the administration of sedation/analgesia as may be necessary or desirable in the judgement of my physician. Witness signature: ___________________________________________________ Date:  ______/______/_____                    Time:  ________ A. M.  P.M.        Patient Name:  Ariadne Méndez [facility]  2/20/1955  [id number]         Patient signature: ___________________________________________________                     Statement of Physician  My signature below affirms that prior to the time of the procedure, I have explained to the patient and/or his/her guardian, the risks and benefits involved in the proposed treatment and any reasonable alternative to the proposed treatment. I have also explained the risks and benefits involved in the refusal of the proposed treatment and have answered the patient's questions.                         Date:  ______/______/_______  Provider                      Signature:  __________________________________________________________       Time:  ___________ AJESSE MARQUEZ

## (undated) NOTE — LETTER
CHASE. Notifier: Enrique/[employer]   ROSALEE. Patient Name: Kaylee Martinez. Identification Number: [id number]      Advance Beneficiary Notice of Noncoverage (ABN)  NOTE:  If Medicare doesn’t pay for D. BILATERAL knee hydrodisection below, you may ha ? OPTION 2. I want the D. BILATERAL knee hydrodisection listed above, but do not bill Medicare. You may ask to be paid now as I am responsible for payment. I cannot appeal if Medicare is not billed. ? OPTION 3. I don’t want the D.listed above.  I Shari Crump